# Patient Record
Sex: FEMALE | Race: OTHER | HISPANIC OR LATINO | Employment: UNEMPLOYED | URBAN - METROPOLITAN AREA
[De-identification: names, ages, dates, MRNs, and addresses within clinical notes are randomized per-mention and may not be internally consistent; named-entity substitution may affect disease eponyms.]

---

## 2023-09-27 ENCOUNTER — ANESTHESIA EVENT (OUTPATIENT)
Dept: SURGERY | Facility: HOSPITAL | Age: 48
DRG: 660 | End: 2023-09-27
Payer: MEDICAID

## 2023-09-27 ENCOUNTER — ANESTHESIA (OUTPATIENT)
Dept: SURGERY | Facility: HOSPITAL | Age: 48
DRG: 660 | End: 2023-09-27
Payer: MEDICAID

## 2023-09-27 ENCOUNTER — HOSPITAL ENCOUNTER (INPATIENT)
Facility: HOSPITAL | Age: 48
LOS: 1 days | Discharge: HOME | DRG: 660 | End: 2023-09-28
Attending: EMERGENCY MEDICINE | Admitting: FAMILY MEDICINE
Payer: MEDICAID

## 2023-09-27 DIAGNOSIS — N39.0 UTI (URINARY TRACT INFECTION): Primary | ICD-10-CM

## 2023-09-27 DIAGNOSIS — N17.9 ACUTE KIDNEY INJURY: ICD-10-CM

## 2023-09-27 DIAGNOSIS — Z01.818 PREOPERATIVE CLEARANCE: ICD-10-CM

## 2023-09-27 DIAGNOSIS — N83.201 CYSTS OF BOTH OVARIES: ICD-10-CM

## 2023-09-27 DIAGNOSIS — N20.1 LEFT URETERAL STONE: ICD-10-CM

## 2023-09-27 DIAGNOSIS — N83.202 CYSTS OF BOTH OVARIES: ICD-10-CM

## 2023-09-27 DIAGNOSIS — N20.1 URETERAL STONE: ICD-10-CM

## 2023-09-27 DIAGNOSIS — R31.9 URINARY TRACT INFECTION WITH HEMATURIA, SITE UNSPECIFIED: ICD-10-CM

## 2023-09-27 DIAGNOSIS — I10 HYPERTENSION, UNSPECIFIED TYPE: ICD-10-CM

## 2023-09-27 DIAGNOSIS — N39.0 URINARY TRACT INFECTION WITH HEMATURIA, SITE UNSPECIFIED: ICD-10-CM

## 2023-09-27 DIAGNOSIS — N30.00 ACUTE CYSTITIS WITHOUT HEMATURIA: ICD-10-CM

## 2023-09-27 DIAGNOSIS — N20.0 URINARY TRACT OBSTRUCTION BY KIDNEY STONE: ICD-10-CM

## 2023-09-27 DIAGNOSIS — N13.8 URINARY TRACT OBSTRUCTION BY KIDNEY STONE: ICD-10-CM

## 2023-09-27 DIAGNOSIS — N20.0 KIDNEY STONE: ICD-10-CM

## 2023-09-27 DIAGNOSIS — R10.30 LOWER ABDOMINAL PAIN: ICD-10-CM

## 2023-09-27 LAB
ALBUMIN SERPL BCP-MCNC: 3.9 G/DL (ref 3.5–5.2)
ALP SERPL-CCNC: 84 U/L (ref 55–135)
ALT SERPL W/O P-5'-P-CCNC: 14 U/L (ref 10–44)
ANION GAP SERPL CALC-SCNC: 16 MMOL/L (ref 8–16)
AST SERPL-CCNC: 19 U/L (ref 10–40)
B-HCG UR QL: NEGATIVE
BACTERIA #/AREA URNS HPF: ABNORMAL /HPF
BASOPHILS # BLD AUTO: 0.04 K/UL (ref 0–0.2)
BASOPHILS NFR BLD: 0.4 % (ref 0–1.9)
BILIRUB SERPL-MCNC: 0.8 MG/DL (ref 0.1–1)
BILIRUB UR QL STRIP: NEGATIVE
BNP SERPL-MCNC: 162 PG/ML (ref 0–99)
BUN SERPL-MCNC: 23 MG/DL (ref 6–20)
CALCIUM SERPL-MCNC: 9.6 MG/DL (ref 8.7–10.5)
CHLORIDE SERPL-SCNC: 106 MMOL/L (ref 95–110)
CLARITY UR: CLEAR
CO2 SERPL-SCNC: 18 MMOL/L (ref 23–29)
COLOR UR: YELLOW
CREAT SERPL-MCNC: 1.6 MG/DL (ref 0.5–1.4)
CTP QC/QA: YES
DIFFERENTIAL METHOD: ABNORMAL
EOSINOPHIL # BLD AUTO: 0.2 K/UL (ref 0–0.5)
EOSINOPHIL NFR BLD: 1.5 % (ref 0–8)
ERYTHROCYTE [DISTWIDTH] IN BLOOD BY AUTOMATED COUNT: 14.5 % (ref 11.5–14.5)
EST. GFR  (NO RACE VARIABLE): 39 ML/MIN/1.73 M^2
GLUCOSE SERPL-MCNC: 132 MG/DL (ref 70–110)
GLUCOSE UR QL STRIP: ABNORMAL
HCT VFR BLD AUTO: 41.8 % (ref 37–48.5)
HCV AB SERPL QL IA: NEGATIVE
HEP C VIRUS HOLD SPECIMEN: NORMAL
HGB BLD-MCNC: 13.4 G/DL (ref 12–16)
HGB UR QL STRIP: NEGATIVE
HIV 1+2 AB+HIV1 P24 AG SERPL QL IA: NEGATIVE
HYALINE CASTS #/AREA URNS LPF: 0 /LPF
IMM GRANULOCYTES # BLD AUTO: 0.05 K/UL (ref 0–0.04)
IMM GRANULOCYTES NFR BLD AUTO: 0.5 % (ref 0–0.5)
KETONES UR QL STRIP: NEGATIVE
LACTATE SERPL-SCNC: 0.7 MMOL/L (ref 0.5–2.2)
LEUKOCYTE ESTERASE UR QL STRIP: ABNORMAL
LIPASE SERPL-CCNC: 24 U/L (ref 4–60)
LYMPHOCYTES # BLD AUTO: 1.8 K/UL (ref 1–4.8)
LYMPHOCYTES NFR BLD: 18 % (ref 18–48)
MCH RBC QN AUTO: 29.3 PG (ref 27–31)
MCHC RBC AUTO-ENTMCNC: 32.1 G/DL (ref 32–36)
MCV RBC AUTO: 91 FL (ref 82–98)
MICROSCOPIC COMMENT: ABNORMAL
MONOCYTES # BLD AUTO: 0.7 K/UL (ref 0.3–1)
MONOCYTES NFR BLD: 6.5 % (ref 4–15)
NEUTROPHILS # BLD AUTO: 7.5 K/UL (ref 1.8–7.7)
NEUTROPHILS NFR BLD: 73.1 % (ref 38–73)
NITRITE UR QL STRIP: NEGATIVE
NRBC BLD-RTO: 0 /100 WBC
PH UR STRIP: 6 [PH] (ref 5–8)
PLATELET # BLD AUTO: 236 K/UL (ref 150–450)
PMV BLD AUTO: 10.2 FL (ref 9.2–12.9)
POTASSIUM SERPL-SCNC: 4.1 MMOL/L (ref 3.5–5.1)
PROT SERPL-MCNC: 8.2 G/DL (ref 6–8.4)
PROT UR QL STRIP: ABNORMAL
RBC # BLD AUTO: 4.58 M/UL (ref 4–5.4)
RBC #/AREA URNS HPF: 2 /HPF (ref 0–4)
SODIUM SERPL-SCNC: 140 MMOL/L (ref 136–145)
SP GR UR STRIP: 1.02 (ref 1–1.03)
SQUAMOUS #/AREA URNS HPF: 1 /HPF
TROPONIN I SERPL DL<=0.01 NG/ML-MCNC: 0.01 NG/ML (ref 0–0.03)
TROPONIN I SERPL DL<=0.01 NG/ML-MCNC: <0.006 NG/ML (ref 0–0.03)
URN SPEC COLLECT METH UR: ABNORMAL
UROBILINOGEN UR STRIP-ACNC: NEGATIVE EU/DL
WBC # BLD AUTO: 10.21 K/UL (ref 3.9–12.7)
WBC #/AREA URNS HPF: 47 /HPF (ref 0–5)
WBC CLUMPS URNS QL MICRO: ABNORMAL

## 2023-09-27 PROCEDURE — 52356 PR CYSTO/URETERO W/LITHOTRIPSY: ICD-10-PCS | Mod: LT,,, | Performed by: UROLOGY

## 2023-09-27 PROCEDURE — 63600175 PHARM REV CODE 636 W HCPCS: Performed by: UROLOGY

## 2023-09-27 PROCEDURE — 93010 ELECTROCARDIOGRAM REPORT: CPT | Mod: ,,, | Performed by: INTERNAL MEDICINE

## 2023-09-27 PROCEDURE — 25000003 PHARM REV CODE 250: Performed by: EMERGENCY MEDICINE

## 2023-09-27 PROCEDURE — 63600175 PHARM REV CODE 636 W HCPCS: Performed by: FAMILY MEDICINE

## 2023-09-27 PROCEDURE — 99284 PR EMERGENCY DEPT VISIT,LEVEL IV: ICD-10-PCS | Mod: 25,,, | Performed by: UROLOGY

## 2023-09-27 PROCEDURE — 25000003 PHARM REV CODE 250: Performed by: FAMILY MEDICINE

## 2023-09-27 PROCEDURE — 99231 PR SUBSEQUENT HOSPITAL CARE,LEVL I: ICD-10-PCS | Mod: 25,,, | Performed by: OBSTETRICS & GYNECOLOGY

## 2023-09-27 PROCEDURE — 96365 THER/PROPH/DIAG IV INF INIT: CPT

## 2023-09-27 PROCEDURE — 99900035 HC TECH TIME PER 15 MIN (STAT)

## 2023-09-27 PROCEDURE — 99284 EMERGENCY DEPT VISIT MOD MDM: CPT | Mod: 25,,, | Performed by: UROLOGY

## 2023-09-27 PROCEDURE — 88300 PR  SURG PATH,GROSS,LEVEL I: ICD-10-PCS | Mod: 26,,, | Performed by: PATHOLOGY

## 2023-09-27 PROCEDURE — 93005 ELECTROCARDIOGRAM TRACING: CPT

## 2023-09-27 PROCEDURE — 88300 SURGICAL PATH GROSS: CPT | Performed by: PATHOLOGY

## 2023-09-27 PROCEDURE — 87186 SC STD MICRODIL/AGAR DIL: CPT | Performed by: EMERGENCY MEDICINE

## 2023-09-27 PROCEDURE — 96375 TX/PRO/DX INJ NEW DRUG ADDON: CPT

## 2023-09-27 PROCEDURE — 82365 CALCULUS SPECTROSCOPY: CPT | Performed by: UROLOGY

## 2023-09-27 PROCEDURE — 25000003 PHARM REV CODE 250: Performed by: UROLOGY

## 2023-09-27 PROCEDURE — 87088 URINE BACTERIA CULTURE: CPT | Performed by: EMERGENCY MEDICINE

## 2023-09-27 PROCEDURE — C2617 STENT, NON-COR, TEM W/O DEL: HCPCS | Performed by: UROLOGY

## 2023-09-27 PROCEDURE — 99285 EMERGENCY DEPT VISIT HI MDM: CPT | Mod: 25

## 2023-09-27 PROCEDURE — 99231 SBSQ HOSP IP/OBS SF/LOW 25: CPT | Mod: 25,,, | Performed by: OBSTETRICS & GYNECOLOGY

## 2023-09-27 PROCEDURE — 94799 UNLISTED PULMONARY SVC/PX: CPT

## 2023-09-27 PROCEDURE — 88300 SURGICAL PATH GROSS: CPT | Mod: 26,,, | Performed by: PATHOLOGY

## 2023-09-27 PROCEDURE — 63600175 PHARM REV CODE 636 W HCPCS: Performed by: NURSE ANESTHETIST, CERTIFIED REGISTERED

## 2023-09-27 PROCEDURE — 71000033 HC RECOVERY, INTIAL HOUR: Performed by: UROLOGY

## 2023-09-27 PROCEDURE — 25000003 PHARM REV CODE 250: Performed by: NURSE ANESTHETIST, CERTIFIED REGISTERED

## 2023-09-27 PROCEDURE — 81025 URINE PREGNANCY TEST: CPT | Performed by: ANESTHESIOLOGY

## 2023-09-27 PROCEDURE — C1769 GUIDE WIRE: HCPCS | Performed by: UROLOGY

## 2023-09-27 PROCEDURE — 27201423 OPTIME MED/SURG SUP & DEVICES STERILE SUPPLY: Performed by: UROLOGY

## 2023-09-27 PROCEDURE — 52356 CYSTO/URETERO W/LITHOTRIPSY: CPT | Mod: LT,,, | Performed by: UROLOGY

## 2023-09-27 PROCEDURE — 71000039 HC RECOVERY, EACH ADD'L HOUR: Performed by: UROLOGY

## 2023-09-27 PROCEDURE — 87077 CULTURE AEROBIC IDENTIFY: CPT | Performed by: EMERGENCY MEDICINE

## 2023-09-27 PROCEDURE — 37000009 HC ANESTHESIA EA ADD 15 MINS: Performed by: UROLOGY

## 2023-09-27 PROCEDURE — 36000706: Performed by: UROLOGY

## 2023-09-27 PROCEDURE — 36000707: Performed by: UROLOGY

## 2023-09-27 PROCEDURE — 21400001 HC TELEMETRY ROOM

## 2023-09-27 PROCEDURE — 93010 ELECTROCARDIOGRAM REPORT: CPT | Mod: 76,,, | Performed by: INTERNAL MEDICINE

## 2023-09-27 PROCEDURE — 93010 EKG 12-LEAD: ICD-10-PCS | Mod: ,,, | Performed by: INTERNAL MEDICINE

## 2023-09-27 PROCEDURE — 96361 HYDRATE IV INFUSION ADD-ON: CPT

## 2023-09-27 PROCEDURE — 63600175 PHARM REV CODE 636 W HCPCS: Performed by: EMERGENCY MEDICINE

## 2023-09-27 PROCEDURE — 37000008 HC ANESTHESIA 1ST 15 MINUTES: Performed by: UROLOGY

## 2023-09-27 PROCEDURE — 83605 ASSAY OF LACTIC ACID: CPT | Performed by: EMERGENCY MEDICINE

## 2023-09-27 DEVICE — STENT URETERAL UNIV 6FR 26CM: Type: IMPLANTABLE DEVICE | Site: URETER | Status: FUNCTIONAL

## 2023-09-27 RX ORDER — PROPOFOL 10 MG/ML
VIAL (ML) INTRAVENOUS
Status: DISCONTINUED | OUTPATIENT
Start: 2023-09-27 | End: 2023-09-27

## 2023-09-27 RX ORDER — HYDROMORPHONE HYDROCHLORIDE 2 MG/ML
0.2 INJECTION, SOLUTION INTRAMUSCULAR; INTRAVENOUS; SUBCUTANEOUS EVERY 5 MIN PRN
Status: DISCONTINUED | OUTPATIENT
Start: 2023-09-27 | End: 2023-09-27 | Stop reason: HOSPADM

## 2023-09-27 RX ORDER — PHENYLEPHRINE HYDROCHLORIDE 10 MG/ML
INJECTION INTRAVENOUS
Status: DISCONTINUED | OUTPATIENT
Start: 2023-09-27 | End: 2023-09-27

## 2023-09-27 RX ORDER — ONDANSETRON 2 MG/ML
4 INJECTION INTRAMUSCULAR; INTRAVENOUS EVERY 6 HOURS PRN
Status: DISCONTINUED | OUTPATIENT
Start: 2023-09-27 | End: 2023-09-28 | Stop reason: HOSPADM

## 2023-09-27 RX ORDER — ACETAMINOPHEN 325 MG/1
650 TABLET ORAL EVERY 6 HOURS PRN
Status: DISCONTINUED | OUTPATIENT
Start: 2023-09-27 | End: 2023-09-28 | Stop reason: HOSPADM

## 2023-09-27 RX ORDER — ONDANSETRON 2 MG/ML
4 INJECTION INTRAMUSCULAR; INTRAVENOUS EVERY 12 HOURS PRN
Status: DISCONTINUED | OUTPATIENT
Start: 2023-09-27 | End: 2023-09-28

## 2023-09-27 RX ORDER — CLONIDINE HYDROCHLORIDE 0.1 MG/1
0.1 TABLET ORAL
Status: COMPLETED | OUTPATIENT
Start: 2023-09-27 | End: 2023-09-27

## 2023-09-27 RX ORDER — SUCCINYLCHOLINE CHLORIDE 20 MG/ML
INJECTION INTRAMUSCULAR; INTRAVENOUS
Status: DISCONTINUED | OUTPATIENT
Start: 2023-09-27 | End: 2023-09-27

## 2023-09-27 RX ORDER — ROCURONIUM BROMIDE 10 MG/ML
INJECTION, SOLUTION INTRAVENOUS
Status: DISCONTINUED | OUTPATIENT
Start: 2023-09-27 | End: 2023-09-27

## 2023-09-27 RX ORDER — OXYCODONE AND ACETAMINOPHEN 5; 325 MG/1; MG/1
1 TABLET ORAL
Status: DISCONTINUED | OUTPATIENT
Start: 2023-09-27 | End: 2023-09-27 | Stop reason: HOSPADM

## 2023-09-27 RX ORDER — HYDROCODONE BITARTRATE AND ACETAMINOPHEN 5; 325 MG/1; MG/1
1 TABLET ORAL EVERY 4 HOURS PRN
Status: DISCONTINUED | OUTPATIENT
Start: 2023-09-27 | End: 2023-09-28 | Stop reason: HOSPADM

## 2023-09-27 RX ORDER — FENTANYL CITRATE 50 UG/ML
INJECTION, SOLUTION INTRAMUSCULAR; INTRAVENOUS
Status: DISCONTINUED | OUTPATIENT
Start: 2023-09-27 | End: 2023-09-27

## 2023-09-27 RX ORDER — AMLODIPINE BESYLATE 5 MG/1
10 TABLET ORAL DAILY
Qty: 30 TABLET | Refills: 0 | Status: SHIPPED | OUTPATIENT
Start: 2023-09-27 | End: 2023-09-28 | Stop reason: SDUPTHER

## 2023-09-27 RX ORDER — MORPHINE SULFATE 4 MG/ML
4 INJECTION, SOLUTION INTRAMUSCULAR; INTRAVENOUS
Status: COMPLETED | OUTPATIENT
Start: 2023-09-27 | End: 2023-09-27

## 2023-09-27 RX ORDER — MORPHINE SULFATE 4 MG/ML
2 INJECTION, SOLUTION INTRAMUSCULAR; INTRAVENOUS EVERY 6 HOURS PRN
Status: DISCONTINUED | OUTPATIENT
Start: 2023-09-27 | End: 2023-09-28

## 2023-09-27 RX ORDER — ONDANSETRON 2 MG/ML
4 INJECTION INTRAMUSCULAR; INTRAVENOUS
Status: COMPLETED | OUTPATIENT
Start: 2023-09-27 | End: 2023-09-27

## 2023-09-27 RX ORDER — ONDANSETRON 2 MG/ML
4 INJECTION INTRAMUSCULAR; INTRAVENOUS DAILY PRN
Status: DISCONTINUED | OUTPATIENT
Start: 2023-09-27 | End: 2023-09-27 | Stop reason: HOSPADM

## 2023-09-27 RX ORDER — CLONIDINE HYDROCHLORIDE 0.1 MG/1
0.1 TABLET ORAL 2 TIMES DAILY
Qty: 60 TABLET | Refills: 0 | Status: SHIPPED | OUTPATIENT
Start: 2023-09-27 | End: 2023-10-03 | Stop reason: SDUPTHER

## 2023-09-27 RX ORDER — ONDANSETRON 4 MG/1
4 TABLET, FILM COATED ORAL EVERY 6 HOURS
Qty: 30 TABLET | Refills: 0 | Status: SHIPPED | OUTPATIENT
Start: 2023-09-27 | End: 2023-09-28 | Stop reason: SDUPTHER

## 2023-09-27 RX ORDER — CLONIDINE HYDROCHLORIDE 0.1 MG/1
0.1 TABLET ORAL DAILY
COMMUNITY
End: 2023-09-27 | Stop reason: SDUPTHER

## 2023-09-27 RX ORDER — MIDAZOLAM HYDROCHLORIDE 1 MG/ML
INJECTION, SOLUTION INTRAMUSCULAR; INTRAVENOUS
Status: DISCONTINUED | OUTPATIENT
Start: 2023-09-27 | End: 2023-09-27

## 2023-09-27 RX ORDER — SODIUM CHLORIDE 9 MG/ML
INJECTION, SOLUTION INTRAVENOUS CONTINUOUS
Status: DISCONTINUED | OUTPATIENT
Start: 2023-09-27 | End: 2023-09-28 | Stop reason: HOSPADM

## 2023-09-27 RX ORDER — CEFDINIR 300 MG/1
300 CAPSULE ORAL 2 TIMES DAILY
Qty: 20 CAPSULE | Refills: 0 | Status: SHIPPED | OUTPATIENT
Start: 2023-09-27 | End: 2023-09-28 | Stop reason: SDUPTHER

## 2023-09-27 RX ORDER — MORPHINE SULFATE 4 MG/ML
4 INJECTION, SOLUTION INTRAMUSCULAR; INTRAVENOUS EVERY 6 HOURS PRN
Status: DISCONTINUED | OUTPATIENT
Start: 2023-09-27 | End: 2023-09-28 | Stop reason: HOSPADM

## 2023-09-27 RX ORDER — HYDRALAZINE HYDROCHLORIDE 20 MG/ML
10 INJECTION INTRAMUSCULAR; INTRAVENOUS EVERY 6 HOURS PRN
Status: DISCONTINUED | OUTPATIENT
Start: 2023-09-27 | End: 2023-09-28 | Stop reason: HOSPADM

## 2023-09-27 RX ADMIN — FENTANYL CITRATE 100 MCG: 50 INJECTION, SOLUTION INTRAMUSCULAR; INTRAVENOUS at 01:09

## 2023-09-27 RX ADMIN — MIDAZOLAM 2 MG: 1 INJECTION INTRAMUSCULAR; INTRAVENOUS at 01:09

## 2023-09-27 RX ADMIN — MORPHINE SULFATE 4 MG: 4 INJECTION INTRAVENOUS at 09:09

## 2023-09-27 RX ADMIN — SODIUM CHLORIDE: 9 INJECTION, SOLUTION INTRAVENOUS at 04:09

## 2023-09-27 RX ADMIN — SUGAMMADEX 200 MG: 100 INJECTION, SOLUTION INTRAVENOUS at 02:09

## 2023-09-27 RX ADMIN — CEFTRIAXONE SODIUM 1 G: 1 INJECTION, POWDER, FOR SOLUTION INTRAMUSCULAR; INTRAVENOUS at 03:09

## 2023-09-27 RX ADMIN — PROPOFOL 25 MG: 10 INJECTION, EMULSION INTRAVENOUS at 02:09

## 2023-09-27 RX ADMIN — ONDANSETRON 4 MG: 2 INJECTION INTRAMUSCULAR; INTRAVENOUS at 02:09

## 2023-09-27 RX ADMIN — MORPHINE SULFATE 4 MG: 4 INJECTION INTRAVENOUS at 03:09

## 2023-09-27 RX ADMIN — ONDANSETRON 4 MG: 2 INJECTION INTRAMUSCULAR; INTRAVENOUS at 09:09

## 2023-09-27 RX ADMIN — HYDROCODONE BITARTRATE AND ACETAMINOPHEN 1 TABLET: 5; 325 TABLET ORAL at 05:09

## 2023-09-27 RX ADMIN — SODIUM CHLORIDE 1000 ML: 9 INJECTION, SOLUTION INTRAVENOUS at 03:09

## 2023-09-27 RX ADMIN — ONDANSETRON 4 MG: 2 INJECTION INTRAMUSCULAR; INTRAVENOUS at 03:09

## 2023-09-27 RX ADMIN — PHENYLEPHRINE HYDROCHLORIDE 150 MCG: 10 INJECTION INTRAVENOUS at 02:09

## 2023-09-27 RX ADMIN — SODIUM CHLORIDE: 9 INJECTION, SOLUTION INTRAVENOUS at 09:09

## 2023-09-27 RX ADMIN — PROPOFOL 200 MG: 10 INJECTION, EMULSION INTRAVENOUS at 01:09

## 2023-09-27 RX ADMIN — ROCURONIUM BROMIDE 10 MG: 10 INJECTION, SOLUTION INTRAVENOUS at 01:09

## 2023-09-27 RX ADMIN — SODIUM CHLORIDE, POTASSIUM CHLORIDE, SODIUM LACTATE AND CALCIUM CHLORIDE: 600; 310; 30; 20 INJECTION, SOLUTION INTRAVENOUS at 01:09

## 2023-09-27 RX ADMIN — CLONIDINE HYDROCHLORIDE 0.1 MG: 0.1 TABLET ORAL at 03:09

## 2023-09-27 RX ADMIN — PHENYLEPHRINE HYDROCHLORIDE 100 MCG: 10 INJECTION INTRAVENOUS at 02:09

## 2023-09-27 RX ADMIN — SUCCINYLCHOLINE CHLORIDE 160 MG: 20 INJECTION, SOLUTION INTRAMUSCULAR; INTRAVENOUS; PARENTERAL at 01:09

## 2023-09-27 RX ADMIN — ROCURONIUM BROMIDE 40 MG: 10 INJECTION, SOLUTION INTRAVENOUS at 02:09

## 2023-09-27 NOTE — ED NOTES
"Assumed care of pt at this time, pt states she feels "much better than when I came in." Pt appears well, sitting up in in ED stretcher on monitor, VSS, skin warm and dry, RR even and unlabored. NADN.  "

## 2023-09-27 NOTE — TRANSFER OF CARE
"Anesthesia Transfer of Care Note    Patient: Yanci George    Procedure(s) Performed: Procedure(s) (LRB):  CYSTOURETEROSCOPY, WITH HOLMIUM LASER LITHOTRIPSY OF URETERAL CALCULUS AND STENT INSERTION (Left)    Patient location: PACU    Anesthesia Type: general    Transport from OR: Transported from OR on room air with adequate spontaneous ventilation    Post pain: adequate analgesia    Post assessment: no apparent anesthetic complications    Post vital signs: stable    Level of consciousness: sedated    Nausea/Vomiting: no nausea/vomiting    Complications: none    Transfer of care protocol was followed      Last vitals:   Visit Vitals  BP (!) 126/57   Pulse 74   Temp 37.6 °C (99.7 °F) (Oral)   Resp 18   Ht 5' 9" (1.753 m)   Wt (!) 151.5 kg (334 lb)   LMP 08/05/2023   SpO2 95%   Breastfeeding No   BMI 49.32 kg/m²     "

## 2023-09-27 NOTE — HPI
Patient is a 48 y.o. Sami speaking female with a PMHx of HTN who presents to the Emergency Department for evaluation of LLQ abdominal pain that radiates to the lower left back which onset a few days PTA. Patient reports having kidney stones and UTI in the past. Symptoms feels similar to previous stones. Associated symptoms include nausea/vomiting, dysuria. Pain has been progressively worsening. Denies any fever, chest pain, sob.     In the ED, CT stone protocol was concerning for 7mm stone in left ureteral vesicular junction along with large bilateral ovarian cysts. Urology consulted in the ed and plans for surgery today. U/A indicative of UTI and patient was started on rocephin.

## 2023-09-27 NOTE — ANESTHESIA PROCEDURE NOTES
Intubation    Date/Time: 9/27/2023 2:01 PM    Performed by: Steve Brian CRNA  Authorized by: Jeffrey Franco II, MD    Intubation:     Induction:  Intravenous    Mask Ventilation:  Easy mask    Attempts:  1    Attempted By:  Student    Method of Intubation:  Video laryngoscopy    Blade:  Billings 3    Laryngeal View Grade: Grade I - full view of cords      Difficult Airway Encountered?: No      Complications:  None    Airway Device:  Oral endotracheal tube    Airway Device Size:  7.5    Style/Cuff Inflation:  Cuffed (inflated to minimal occlusive pressure)    Tube secured:  21    Secured at:  The lips    Placement Verified By:  Capnometry    Complicating Factors:  None    Findings Post-Intubation:  BS equal bilateral  Notes:      Reji Garcia srna

## 2023-09-27 NOTE — CONSULTS
"              Urology Consult    Consulting Physician: Vishnu BROOKS, ER    Reason for consultation: ureter stone    Chief Complaint:  Urinary tract obstruction by kidney stone    HPI:    Yanci George is a 48 y.o.  female who presented to emergency room with a 24 hour history of left lower quadrant pain nausea and vomiting.  She states she is been having pain on and off for 2 years.  She states she has a long history of stones and had 5 stones the last time she was evaluated.  [place] also was told she had large cysts in her uterus.  She denies any fevers or chills.  She states she has been treated for urinary infections over the last few months.    Past Medical History:   Diagnosis Date    Hypertension     Enlarged heart    Past Surgical History:   Procedure Laterality Date     SECTION N/A     For C-sections    Social History     Socioeconomic History    Marital status: Unknown   Tobacco Use    Smoking status: Never    Smokeless tobacco: Never   Substance and Sexual Activity    Drug use: Never      She denies any tobacco or alcohol   She takes care of her   History reviewed. No pertinent family history.     Review of patient's allergies indicates:  No Known Allergies    Medications:      To antihypertensives      VITALS (Last 24H):  Temp:  [99.7 °F (37.6 °C)]   Pulse:  [67-92]   Resp:  [12-28]   BP: (119-205)/(57-84)   SpO2:  [94 %-98 %]     INTAKE & OUTPUT (Last 24H):  No intake or output data in the 24 hours ending 23 1232      PHYSICAL EXAM:    Mild distress secondary to pain   Morbidly obese   Abdomen is soft   Left lower quadrant tenderness         Laboratory Data:  Reviewed and noted in plan where applicable. Please see chart for full laboratory data.    Recent Labs   Lab 23  0252   TROPONINI 0.015  <0.006    No results for input(s): "POCTGLUCOSE" in the last 24 hours.     No results found for: "INR", "PROTIME"    Lab Results   Component " Value Date    WBC 10.21 09/27/2023    HGB 13.4 09/27/2023    HCT 41.8 09/27/2023    MCV 91 09/27/2023     09/27/2023       BMP  Recent Labs   Lab 09/27/23  0252   *      K 4.1      CO2 18*   BUN 23*   CREATININE 1.6*   CALCIUM 9.6     Component Ref Range & Units 02:48   RBC, UA 0 - 4 /hpf 2    WBC, UA 0 - 5 /hpf 47 High     WBC Clumps, UA None-Rare Few Abnormal     Bacteria None-Occ /hpf Many Abnormal     Squam Epithel, UA /hpf 1    Hyaline Casts, UA 0-1/lpf /lpf 0    Microscopic Comment  SEE COMMENT    Comment: Other formed elements not mentioned in the report are not   present in the microscopic examination.    Resulting Agency  [facility]       Radiology:  Reviewed and noted in plan where applicable. Please see chart for full radiology data.  X-Ray Chest AP Portable  Narrative: EXAMINATION:  XR CHEST AP PORTABLE    CLINICAL HISTORY:  Chest Pain;    FINDINGS:  Comparison: None available.    Mediastinal silhouette is enlarged.  The lungs are clear.  Possible small left pleural effusion.  No acute osseous finding.  Impression: Cardiomegaly and possible small left pleural effusion.    Electronically signed by: Nitin Viramontes  Date:    09/27/2023  Time:    07:52  CT Renal Stone Study ABD Pelvis WO  Narrative: EXAMINATION:  CT RENAL STONE STUDY ABD PELVIS WO    CLINICAL HISTORY:  Flank pain, kidney stone suspected;    COMPARISON:  None available.    TECHNIQUE:  Axial CT images were obtained of the abdomen and pelvis.  Iterative reconstruction technique was used. The CT exam was performed using one or more of the following dose reduction techniques- Automated exposure control, adjustment of the mA and/or kV according to patient size, and/or use of iterative reconstructed technique.    FINDINGS:  Heart: Heart is enlarged.    Lung Bases: Mild bibasilar atelectasis/scarring.    Liver: Unremarkable.    Gallbladder: No calcified gallstones.    Bile Ducts: No evidence of dilated ducts.    Pancreas: No mass  or peripancreatic fat stranding.    Spleen: Unremarkable.    Adrenals: Unremarkable.    Kidneys/ Ureters: 7 mm calculus at the left ureteral vesicular junction.  Left kidney has areas of scarring and atrophy.  Right kidney has a parenchymal calcification.  No renal collecting system calcification.  No hydronephrosis or hydroureter.    Bladder: Previously described stone at the ureteral vesicular junction.  Bladder is otherwise unremarkable in appearance.    Reproductive organs: There are large bilateral adnexal cystic lesions, the left measures up to 17 cm in the right measures up to approximately 10 cm.    GI Tract/Mesentery: Numerous colonic diverticula.  No CT evidence of acute diverticulitis.  The appendix is normal appearance.    Peritoneal Space: Unremarkable.    Retroperitoneum: No adenopathy.    Abdominal wall: Unremarkable.    Vasculature: Mild atherosclerotic disease. No aortic aneurysm.    Bones: No acute fracture.  Impression: 1. 7 mm calculus at the left ureteral vesicular junction.  Chronic scarring and atrophy involving the left kidney.  2. Large bilateral ovarian cystic lesions measuring up to 17 cm on the left.  Follow-up with and management by gynecology is recommended.    Electronically signed by: Nitin Viramontes  Date:    09/27/2023  Time:    07:37       ASSESSMENT/PLAN:      Left lower quadrant pain.  Left ureteral stone.  Bilateral large ovarian cysts.    I reviewed her her independently reviewed her CT scan.  She does have an atrophic, likely non functional kidney on the left side with a distal left ureteral calculus.  I suspect this stone has been there for several years.  Her left-sided pain may be due to the ureteral stone however also could be due to the large ovarian cysts. Will defer to gynecology as outpatient or inpatient.  With low-grade fever and urinalysis consistent with possible UTI and obstructing left distal ureteral stone, I recommend cystoscopy with left ureteral stent  placement and possible ureteroscopy to retrieve the stone.  I discussed all of this in detail with her as well as her history through a online  using both audio and video.  She was consented for a left ureteroscopic stone extraction.  She understands risks and benefits.  She understands the stent is temporary.  She understands the importance of follow up.

## 2023-09-27 NOTE — ANESTHESIA POSTPROCEDURE EVALUATION
Anesthesia Post Evaluation    Patient: Yanci George    Procedure(s) Performed: Procedure(s) (LRB):  CYSTOURETEROSCOPY, WITH HOLMIUM LASER LITHOTRIPSY OF URETERAL CALCULUS AND STENT INSERTION (Left)    Final Anesthesia Type: general      Patient location during evaluation: PACU  Patient participation: Yes- Able to Participate  Level of consciousness: awake and alert  Post-procedure vital signs: reviewed and stable  Pain management: adequate  Airway patency: patent  KIMBERLY mitigation strategies: Verification of full reversal of neuromuscular block  PONV status at discharge: No PONV  Anesthetic complications: no      Cardiovascular status: hemodynamically stable  Respiratory status: spontaneous ventilation  Hydration status: euvolemic  Follow-up not needed.          Vitals Value Taken Time   /65 09/27/23 1514   Temp 36.8 °C (98.3 °F) 09/27/23 1456   Pulse 73 09/27/23 1520   Resp 23 09/27/23 1520   SpO2 95 % 09/27/23 1520   Vitals shown include unvalidated device data.      No case tracking events are documented in the log.      Pain/Susannah Score: Pain Rating Prior to Med Admin: 8 (9/27/2023  3:54 AM)  Pain Rating Post Med Admin: 3 (9/27/2023  4:19 AM)  Susannah Score: 7 (9/27/2023  3:00 PM)

## 2023-09-27 NOTE — ED PROVIDER NOTES
SCRIBE #1 NOTE: IFela, am scribing for, and in the presence of, Solomon Branch MD. I have scribed the HPI, ROS, and PEx.     SCRIBE #2 NOTE: I, Susan Nixon, am scribing for, and in the presence of,  Hardik Mares MD. I have scribed the remaining portions of the note not scribed by Scribe #1.      History     Chief Complaint   Patient presents with    Abdominal Pain     LLQ abd pain starting few days ago. HA and back pain also. +nausea, mouth is very dry and she has reflux. Burning with urination.    Medication Refill     Needs refill of BP medications     Review of patient's allergies indicates:  No Known Allergies      History of Present Illness     HPI    2023, 4:24 AM  History obtained from the patient with tele-      History of Present Illness: Yanci George is a 48 y.o. female patient with a PMHx of HTN who presents to the Emergency Department for evaluation of LLQ abdominal pain that radiates to the lower left back which onset a few days PTA. The pt states that she has a Hx of nephrolithiasis and UTIs. Symptoms are constant and moderate in severity. No mitigating or exacerbating factors reported. Associated sxs include N/V, dysuria, headache, and SOB. Patient denies any fever, CP, sore throat, cough, weakness, and all other sxs at this time. No prior Tx reported. The pt is also requesting a refill for her HTN medications: Clonidine and Enalapril. No further complaints or concerns at this time.       Arrival mode: Personal vehicle    PCP: No primary care provider on file.        Past Medical History:  Past Medical History:   Diagnosis Date    Hypertension        Past Surgical History:  Past Surgical History:   Procedure Laterality Date     SECTION N/A          Family History:  History reviewed. No pertinent family history.    Social History:  Social History     Tobacco Use    Smoking status: Never    Smokeless tobacco: Never   Substance and Sexual Activity     Alcohol use: Not on file    Drug use: Never    Sexual activity: Not on file        Review of Systems     Review of Systems   Constitutional:  Negative for fever.   HENT:  Negative for sore throat.    Respiratory:  Positive for shortness of breath. Negative for cough.    Cardiovascular:  Negative for chest pain.   Gastrointestinal:  Positive for abdominal pain (LLQ), nausea and vomiting.   Genitourinary:  Positive for dysuria.   Musculoskeletal:  Positive for back pain (lower left).   Skin:  Negative for rash.   Neurological:  Positive for headaches. Negative for weakness.   Hematological:  Does not bruise/bleed easily.   All other systems reviewed and are negative.     Physical Exam     Initial Vitals   BP Pulse Resp Temp SpO2   09/27/23 0237 09/27/23 0234 09/27/23 0234 09/27/23 0237 09/27/23 0234   (!) 205/83 92 (!) 24 99.7 °F (37.6 °C) 98 %      MAP       --                 Physical Exam  Nursing Notes and Vital Signs Reviewed.  Constitutional: Patient is in no acute distress. Well-developed and well-nourished.  Head: Atraumatic. Normocephalic.  Eyes: PERRL. EOM intact. Conjunctivae are not pale. No scleral icterus.  ENT: Mucous membranes are moist. Oropharynx is clear and symmetric.    Neck: Supple. Full ROM. No lymphadenopathy.  Cardiovascular: Regular rate. Regular rhythm. No murmurs, rubs, or gallops. Distal pulses are 2+ and symmetric.  Pulmonary/Chest: No respiratory distress. Clear to auscultation bilaterally. No wheezing or rales.  Abdominal: Soft and non-distended.  There is no tenderness.  No rebound, guarding, or rigidity. Good bowel sounds.  Genitourinary: No CVA tenderness  Musculoskeletal: Moves all extremities. No obvious deformities. No edema. No calf tenderness.  Skin: Warm and dry.  Neurological:  Alert, awake, and appropriate.  Normal speech.  No acute focal neurological deficits are appreciated.  Psychiatric: Normal affect. Good eye contact. Appropriate in content.     ED Course  "  Procedures  ED Vital Signs:  Vitals:    09/27/23 0632 09/27/23 0717 09/27/23 0903 09/27/23 1017   BP: 125/67 (!) 119/58 138/74 (!) 126/57   Pulse: 73 77 67    Resp: (!) 28 20 18    Temp:       TempSrc:       SpO2: 96% (!) 94% 95%    Weight:       Height:        09/27/23 1019 09/27/23 1021 09/27/23 1317 09/27/23 1456   BP:    (!) 162/76   Pulse: 74   109   Resp:    20   Temp:    98.3 °F (36.8 °C)   TempSrc:    Temporal   SpO2:  95%  96%   Weight:       Height:   5' 9" (1.753 m)     09/27/23 1500 09/27/23 1505 09/27/23 1515 09/27/23 1530   BP: (!) 145/66 (!) 142/67 139/65 128/70   Pulse: 74 79 74 74   Resp: 15 14 16 16   Temp:       TempSrc:       SpO2: 97% 95% 100% 97%   Weight:       Height:        09/27/23 1545 09/27/23 1610 09/27/23 1618   BP: 130/67 132/62    Pulse: 66 71    Resp: (!) 7 18    Temp: 98.2 °F (36.8 °C) 97.7 °F (36.5 °C)    TempSrc: Temporal Oral    SpO2: 99% 100%    Weight:   (!) 152 kg (335 lb 1.6 oz)   Height:          Abnormal Lab Results:  Labs Reviewed   CBC W/ AUTO DIFFERENTIAL - Abnormal; Notable for the following components:       Result Value    Immature Grans (Abs) 0.05 (*)     Gran % 73.1 (*)     All other components within normal limits   COMPREHENSIVE METABOLIC PANEL - Abnormal; Notable for the following components:    CO2 18 (*)     Glucose 132 (*)     BUN 23 (*)     Creatinine 1.6 (*)     eGFR 39 (*)     All other components within normal limits   B-TYPE NATRIURETIC PEPTIDE - Abnormal; Notable for the following components:     (*)     All other components within normal limits   URINALYSIS, REFLEX TO URINE CULTURE - Abnormal; Notable for the following components:    Protein, UA 3+ (*)     Glucose, UA Trace (*)     Leukocytes, UA 2+ (*)     All other components within normal limits    Narrative:     Specimen Source->Urine   URINALYSIS MICROSCOPIC - Abnormal; Notable for the following components:    WBC, UA 47 (*)     WBC Clumps, UA Few (*)     Bacteria Many (*)     All other " components within normal limits    Narrative:     Specimen Source->Urine   CULTURE, URINE   TROPONIN I   TROPONIN I   LIPASE   HIV 1 / 2 ANTIBODY    Narrative:     Release to patient->Immediate   HEPATITIS C ANTIBODY    Narrative:     Release to patient->Immediate   HEP C VIRUS HOLD SPECIMEN    Narrative:     Release to patient->Immediate   LACTIC ACID, PLASMA        All Lab Results:  Results for orders placed or performed during the hospital encounter of 09/27/23   CBC auto differential   Result Value Ref Range    WBC 10.21 3.90 - 12.70 K/uL    RBC 4.58 4.00 - 5.40 M/uL    Hemoglobin 13.4 12.0 - 16.0 g/dL    Hematocrit 41.8 37.0 - 48.5 %    MCV 91 82 - 98 fL    MCH 29.3 27.0 - 31.0 pg    MCHC 32.1 32.0 - 36.0 g/dL    RDW 14.5 11.5 - 14.5 %    Platelets 236 150 - 450 K/uL    MPV 10.2 9.2 - 12.9 fL    Immature Granulocytes 0.5 0.0 - 0.5 %    Gran # (ANC) 7.5 1.8 - 7.7 K/uL    Immature Grans (Abs) 0.05 (H) 0.00 - 0.04 K/uL    Lymph # 1.8 1.0 - 4.8 K/uL    Mono # 0.7 0.3 - 1.0 K/uL    Eos # 0.2 0.0 - 0.5 K/uL    Baso # 0.04 0.00 - 0.20 K/uL    nRBC 0 0 /100 WBC    Gran % 73.1 (H) 38.0 - 73.0 %    Lymph % 18.0 18.0 - 48.0 %    Mono % 6.5 4.0 - 15.0 %    Eosinophil % 1.5 0.0 - 8.0 %    Basophil % 0.4 0.0 - 1.9 %    Differential Method Automated    Comprehensive metabolic panel   Result Value Ref Range    Sodium 140 136 - 145 mmol/L    Potassium 4.1 3.5 - 5.1 mmol/L    Chloride 106 95 - 110 mmol/L    CO2 18 (L) 23 - 29 mmol/L    Glucose 132 (H) 70 - 110 mg/dL    BUN 23 (H) 6 - 20 mg/dL    Creatinine 1.6 (H) 0.5 - 1.4 mg/dL    Calcium 9.6 8.7 - 10.5 mg/dL    Total Protein 8.2 6.0 - 8.4 g/dL    Albumin 3.9 3.5 - 5.2 g/dL    Total Bilirubin 0.8 0.1 - 1.0 mg/dL    Alkaline Phosphatase 84 55 - 135 U/L    AST 19 10 - 40 U/L    ALT 14 10 - 44 U/L    eGFR 39 (A) >60 mL/min/1.73 m^2    Anion Gap 16 8 - 16 mmol/L   Troponin I #1   Result Value Ref Range    Troponin I <0.006 0.000 - 0.026 ng/mL   Troponin I #2   Result Value Ref  Range    Troponin I 0.015 0.000 - 0.026 ng/mL   BNP   Result Value Ref Range     (H) 0 - 99 pg/mL   Lipase   Result Value Ref Range    Lipase 24 4 - 60 U/L   Urinalysis, Reflex to Urine Culture Urine, Clean Catch    Specimen: Urine   Result Value Ref Range    Specimen UA Urine, Clean Catch     Color, UA Yellow Yellow, Straw, Lisbeth    Appearance, UA Clear Clear    pH, UA 6.0 5.0 - 8.0    Specific Gravity, UA 1.020 1.005 - 1.030    Protein, UA 3+ (A) Negative    Glucose, UA Trace (A) Negative    Ketones, UA Negative Negative    Bilirubin (UA) Negative Negative    Occult Blood UA Negative Negative    Nitrite, UA Negative Negative    Urobilinogen, UA Negative <2.0 EU/dL    Leukocytes, UA 2+ (A) Negative   HIV 1/2 Ag/Ab (4th Gen)   Result Value Ref Range    HIV 1/2 Ag/Ab Negative Negative   Hepatitis C Antibody   Result Value Ref Range    Hepatitis C Ab Negative Negative   HCV Virus Hold Specimen   Result Value Ref Range    HEP C Virus Hold Specimen Hold for HCV sendout    Urinalysis Microscopic   Result Value Ref Range    RBC, UA 2 0 - 4 /hpf    WBC, UA 47 (H) 0 - 5 /hpf    WBC Clumps, UA Few (A) None-Rare    Bacteria Many (A) None-Occ /hpf    Squam Epithel, UA 1 /hpf    Hyaline Casts, UA 0 0-1/lpf /lpf    Microscopic Comment SEE COMMENT    Lactic acid, plasma   Result Value Ref Range    Lactate (Lactic Acid) 0.7 0.5 - 2.2 mmol/L   POCT urine pregnancy   Result Value Ref Range    POC Preg Test, Ur Negative Negative     Acceptable Yes          Imaging Results:  Imaging Results              CT Renal Stone Study ABD Pelvis WO (Final result)  Result time 09/27/23 07:37:40      Final result by Nitin Viramontes MD (09/27/23 07:37:40)                   Impression:      1. 7 mm calculus at the left ureteral vesicular junction.  Chronic scarring and atrophy involving the left kidney.  2. Large bilateral ovarian cystic lesions measuring up to 17 cm on the left.  Follow-up with and management by gynecology is  recommended.      Electronically signed by: Nitin Viramontes  Date:    09/27/2023  Time:    07:37               Narrative:    EXAMINATION:  CT RENAL STONE STUDY ABD PELVIS WO    CLINICAL HISTORY:  Flank pain, kidney stone suspected;    COMPARISON:  None available.    TECHNIQUE:  Axial CT images were obtained of the abdomen and pelvis.  Iterative reconstruction technique was used. The CT exam was performed using one or more of the following dose reduction techniques- Automated exposure control, adjustment of the mA and/or kV according to patient size, and/or use of iterative reconstructed technique.    FINDINGS:  Heart: Heart is enlarged.    Lung Bases: Mild bibasilar atelectasis/scarring.    Liver: Unremarkable.    Gallbladder: No calcified gallstones.    Bile Ducts: No evidence of dilated ducts.    Pancreas: No mass or peripancreatic fat stranding.    Spleen: Unremarkable.    Adrenals: Unremarkable.    Kidneys/ Ureters: 7 mm calculus at the left ureteral vesicular junction.  Left kidney has areas of scarring and atrophy.  Right kidney has a parenchymal calcification.  No renal collecting system calcification.  No hydronephrosis or hydroureter.    Bladder: Previously described stone at the ureteral vesicular junction.  Bladder is otherwise unremarkable in appearance.    Reproductive organs: There are large bilateral adnexal cystic lesions, the left measures up to 17 cm in the right measures up to approximately 10 cm.    GI Tract/Mesentery: Numerous colonic diverticula.  No CT evidence of acute diverticulitis.  The appendix is normal appearance.    Peritoneal Space: Unremarkable.    Retroperitoneum: No adenopathy.    Abdominal wall: Unremarkable.    Vasculature: Mild atherosclerotic disease. No aortic aneurysm.    Bones: No acute fracture.                                       X-Ray Chest AP Portable (Final result)  Result time 09/27/23 07:52:15      Final result by Nitin Viramontes MD (09/27/23 07:52:15)                    Impression:      Cardiomegaly and possible small left pleural effusion.      Electronically signed by: Nitin Viramontes  Date:    09/27/2023  Time:    07:52               Narrative:    EXAMINATION:  XR CHEST AP PORTABLE    CLINICAL HISTORY:  Chest Pain;    FINDINGS:  Comparison: None available.    Mediastinal silhouette is enlarged.  The lungs are clear.  Possible small left pleural effusion.  No acute osseous finding.                        Wet Read by Solomon Branch MD (09/27/23 03:42:40, [facility] - Emergency Dept[facility] Emergency Medicine)    cardiomegaly                                     The EKG was ordered, reviewed, and independently interpreted by the ED provider.  Interpretation time: 02:51  Rate: 86 BPM  Rhythm: normal sinus rhythm  Interpretation: Possible left atrial enlargement. Left axis deviation. Left bundle branch block. No STEMI.           The Emergency Provider reviewed the vital signs and test results, which are outlined above.     ED Discussion     6:00 AM: Dr. Branch transfers care of patient to Dr. Mares pending imaging results.    11:44 AM: Following consult with Dr. Patrick who recommends to keep the pt NPO until he can see them.    12:14 PM: Discussed case with  (Hospital Medicine). Dr. Arana agrees with current care and management of pt and accepts admission.   Admitting Service: Hospital Medicine  Admitting Physician: Dr. Arana  Admit to: med surg     12:14 PM: Re-evaluated pt. I have discussed test results, shared treatment plan, and the need for admission with patient and family at bedside. Pt and family express understanding at this time and agree with all information. All questions answered. Pt and family have no further questions or concerns at this time. Pt is ready for admit.     ED Course as of 09/27/23 1630   Wed Sep 27, 2023   0706 BNP(!): 162 [ABBI]   0707 CO2(!): 18 [ABBI]   0707 BUN(!): 23 [ABBI]   0707 Creatinine(!): 1.6 [ABBI]   0707 Troponin I: 0.015 [ABBI]   0707 Lipase: 24 [ABBI]   0707  WBC: 10.21 [ABBI]   0707 Hemoglobin: 13.4 [ABBI]   0707 Hematocrit: 41.8 [ABBI]   0707 WBC, UA(!): 47 [ABBI]   0813 CT Renal Stone Study ABD Pelvis WO  Positive stone noted [ABBI]   0818 CT Renal Stone Study ABD Pelvis WO [ABBI]      ED Course User Index  [ABBI] Hardik Mares MD     Medical Decision Making  Problems Addressed:  Acute kidney injury: undiagnosed new problem with uncertain prognosis  Kidney stone: acute illness or injury that poses a threat to life or bodily functions  Lower abdominal pain: acute illness or injury that poses a threat to life or bodily functions  UTI (urinary tract infection): acute illness or injury that poses a threat to life or bodily functions    Amount and/or Complexity of Data Reviewed  Independent Historian: spouse     Details: Relates that the patient has had similar episodes in the past but they resolved after pain medication-it was when she was in [place] about 3 years ago  Labs: ordered. Decision-making details documented in ED Course.  Radiology: ordered and independent interpretation performed. Decision-making details documented in ED Course.     Details: Left kidney stone - small left kidney (question chronic obstruction)  ECG/medicine tests: ordered. Decision-making details documented in ED Course.     Details: No STEMI  Discussion of management or test interpretation with external provider(s): I discussed the care with Hospital Medicine and they agree with the current management.   requests I placed the order for admission and he will handle all additional orders.    Risk  Prescription drug management.  Parenteral controlled substances.  Decision regarding hospitalization.  Diagnosis or treatment significantly limited by social determinants of health.  Risk Details: Differential diagnosis;  Gastroenteritis, Bowel obstruction, Colitis, Diverticulitis, Cholecystitis, Appendicitis, Perforated bowel, Herniation, Infectious etiology, UTI, Pyelonephritis, Inferior cardiac infarct,  Biliary obstruction, kidney stone                 ED Medication(s):  Medications   cefTRIAXone (ROCEPHIN) 1 g in dextrose 5 % in water (D5W) 100 mL IVPB (MB+) (has no administration in time range)   morphine injection 4 mg (has no administration in time range)   morphine injection 2 mg (has no administration in time range)   0.9%  NaCl infusion ( Intravenous New Bag 9/27/23 1617)   acetaminophen tablet 650 mg (has no administration in time range)   ondansetron injection 4 mg (4 mg Intravenous Given 9/27/23 1412)   hydrALAZINE injection 10 mg (has no administration in time range)   HYDROcodone-acetaminophen 5-325 mg per tablet 1 tablet (has no administration in time range)   ondansetron injection 4 mg (has no administration in time range)   cefTRIAXone (ROCEPHIN) 1 g in dextrose 5 % in water (D5W) 100 mL IVPB (MB+) (0 g Intravenous Stopped 9/27/23 0403)   sodium chloride 0.9% bolus 1,000 mL 1,000 mL (0 mLs Intravenous Stopped 9/27/23 0634)   cloNIDine tablet 0.1 mg (0.1 mg Oral Given 9/27/23 0353)   ondansetron injection 4 mg (4 mg Intravenous Given 9/27/23 0354)   morphine injection 4 mg (4 mg Intravenous Given 9/27/23 0354)       Current Discharge Medication List        START taking these medications    Details   amLODIPine (NORVASC) 5 MG tablet Take 2 tablets (10 mg total) by mouth once daily.  Qty: 30 tablet, Refills: 0    Comments: .      cefdinir (OMNICEF) 300 MG capsule Take 1 capsule (300 mg total) by mouth 2 (two) times daily. for 10 days  Qty: 20 capsule, Refills: 0      ondansetron (ZOFRAN) 4 MG tablet Take 1 tablet (4 mg total) by mouth every 6 (six) hours.  Qty: 30 tablet, Refills: 0              Follow-up Information       Please follow up.    Contact information:  Follow-up with your primary care doctor in 1-2 days for recheck                               Scribe Attestation:   Scribe #1: I performed the above scribed service and the documentation accurately describes the services I performed. I attest  to the accuracy of the note.     Attending:   Physician Attestation Statement for Scribe #1: I, Solomon Branch MD, personally performed the services described in this documentation, as scribed by Fela Rodriguez, in my presence, and it is both accurate and complete.       Scribe Attestation:   Scribe #2: I performed the above scribed service and the documentation accurately describes the services I performed. I attest to the accuracy of the note.    Attending Attestation:           Physician Attestation for Scribe:    Physician Attestation Statement for Scribe #2: I, Hardik Mares MD, reviewed documentation, as scribed by Susan Nixon in my presence, and it is both accurate and complete. I also acknowledge and confirm the content of the note done by Scribe #1.           Clinical Impression       ICD-10-CM ICD-9-CM   1. UTI (urinary tract infection)  N39.0 599.0   2. Lower abdominal pain  R10.30 789.09   3. Hypertension, unspecified type  I10 401.9   4. Urinary tract infection with hematuria, site unspecified  N39.0 599.0    R31.9 599.70   5. Acute kidney injury  N17.9 584.9   6. Preoperative clearance  Z01.818 V72.84   7. Kidney stone  N20.0 592.0   8. Ureteral stone  N20.1 592.1   9. Left ureteral stone  N20.1 592.1   10. Urinary tract obstruction by kidney stone  N20.0 592.0    N13.8 599.69   11. Acute cystitis without hematuria  N30.00 595.0       Disposition:   Disposition: Admitted  Condition: Hardik Bah MD  09/27/23 5568

## 2023-09-27 NOTE — H&P
[facility]  [facility] Medicine  History & Physical    Patient Name: Yanci George  MRN: [id number]  Patient Class: IP- Inpatient  Admission Date: 2023  Attending Physician: Brian Arana MD  Primary Care Provider: No primary care provider on file.         Patient information was obtained from patient and ER records.     Subjective:     Principal Problem:Urinary tract obstruction by kidney stone    Chief Complaint:   Chief Complaint   Patient presents with    Abdominal Pain     LLQ abd pain starting few days ago. HA and back pain also. +nausea, mouth is very dry and she has reflux. Burning with urination.    Medication Refill     Needs refill of BP medications        HPI: Patient is a 48 y.o. Cymro speaking female with a PMHx of HTN who presents to the Emergency Department for evaluation of LLQ abdominal pain that radiates to the lower left back which onset a few days PTA. Patient reports having kidney stones and UTI in the past. Symptoms feels similar to previous stones. Associated symptoms include nausea/vomiting, dysuria. Pain has been progressively worsening. Denies any fever, chest pain, sob.     In the ED, CT stone protocol was concerning for 7mm stone in left ureteral vesicular junction along with large bilateral ovarian cysts. Urology consulted in the ed and plans for surgery today. U/A indicative of UTI and patient was started on rocephin.         Past Medical History:   Diagnosis Date    Hypertension        Past Surgical History:   Procedure Laterality Date     SECTION N/A        Review of patient's allergies indicates:  No Known Allergies    No current facility-administered medications on file prior to encounter.     Current Outpatient Medications on File Prior to Encounter   Medication Sig    [DISCONTINUED] cloNIDine (CATAPRES) 0.1 MG tablet Take 0.1 mg by mouth once daily.     Family History    None       Tobacco Use    Smoking status: Never    Smokeless tobacco:  Never   Substance and Sexual Activity    Alcohol use: Not on file    Drug use: Never    Sexual activity: Not on file     Review of Systems   Constitutional:  Negative for fatigue and fever.   HENT:  Negative for sinus pressure.    Eyes:  Negative for visual disturbance.   Respiratory:  Negative for shortness of breath.    Cardiovascular:  Negative for chest pain.   Gastrointestinal:  Positive for nausea and vomiting.   Genitourinary:  Positive for dysuria, flank pain and frequency. Negative for difficulty urinating.   Musculoskeletal:  Negative for back pain.   Skin:  Negative for rash.   Neurological:  Negative for headaches.   Psychiatric/Behavioral:  Negative for confusion.      Objective:     Vital Signs (Most Recent):  Temp: 99.7 °F (37.6 °C) (09/27/23 0237)  Pulse: 74 (09/27/23 1019)  Resp: 18 (09/27/23 0903)  BP: (!) 126/57 (09/27/23 1017)  SpO2: 95 % (09/27/23 1021) Vital Signs (24h Range):  Temp:  [99.7 °F (37.6 °C)] 99.7 °F (37.6 °C)  Pulse:  [67-92] 74  Resp:  [12-28] 18  SpO2:  [94 %-98 %] 95 %  BP: (119-205)/(57-84) 126/57     Weight: (!) 151.5 kg (334 lb)  There is no height or weight on file to calculate BMI.     Physical Exam  Constitutional:       General: She is not in acute distress.     Appearance: She is well-developed. She is obese. She is not diaphoretic.   HENT:      Head: Normocephalic and atraumatic.   Eyes:      Pupils: Pupils are equal, round, and reactive to light.   Cardiovascular:      Rate and Rhythm: Normal rate and regular rhythm.      Heart sounds: Normal heart sounds. No murmur heard.     No friction rub. No gallop.   Pulmonary:      Effort: Pulmonary effort is normal. No respiratory distress.      Breath sounds: Normal breath sounds. No stridor. No wheezing or rales.   Abdominal:      General: Bowel sounds are normal. There is no distension.      Palpations: Abdomen is soft. There is no mass.      Tenderness: There is no abdominal tenderness. There is left CVA tenderness.  There is no right CVA tenderness or guarding.   Musculoskeletal:      Right lower leg: No edema.      Left lower leg: No edema.   Skin:     General: Skin is warm.      Findings: No erythema.   Neurological:      Mental Status: She is alert and oriented to person, place, and time.              CRANIAL NERVES     CN III, IV, VI   Pupils are equal, round, and reactive to light.       Significant Labs:   Results for orders placed or performed during the hospital encounter of 09/27/23   CBC auto differential   Result Value Ref Range    WBC 10.21 3.90 - 12.70 K/uL    RBC 4.58 4.00 - 5.40 M/uL    Hemoglobin 13.4 12.0 - 16.0 g/dL    Hematocrit 41.8 37.0 - 48.5 %    MCV 91 82 - 98 fL    MCH 29.3 27.0 - 31.0 pg    MCHC 32.1 32.0 - 36.0 g/dL    RDW 14.5 11.5 - 14.5 %    Platelets 236 150 - 450 K/uL    MPV 10.2 9.2 - 12.9 fL    Immature Granulocytes 0.5 0.0 - 0.5 %    Gran # (ANC) 7.5 1.8 - 7.7 K/uL    Immature Grans (Abs) 0.05 (H) 0.00 - 0.04 K/uL    Lymph # 1.8 1.0 - 4.8 K/uL    Mono # 0.7 0.3 - 1.0 K/uL    Eos # 0.2 0.0 - 0.5 K/uL    Baso # 0.04 0.00 - 0.20 K/uL    nRBC 0 0 /100 WBC    Gran % 73.1 (H) 38.0 - 73.0 %    Lymph % 18.0 18.0 - 48.0 %    Mono % 6.5 4.0 - 15.0 %    Eosinophil % 1.5 0.0 - 8.0 %    Basophil % 0.4 0.0 - 1.9 %    Differential Method Automated    Comprehensive metabolic panel   Result Value Ref Range    Sodium 140 136 - 145 mmol/L    Potassium 4.1 3.5 - 5.1 mmol/L    Chloride 106 95 - 110 mmol/L    CO2 18 (L) 23 - 29 mmol/L    Glucose 132 (H) 70 - 110 mg/dL    BUN 23 (H) 6 - 20 mg/dL    Creatinine 1.6 (H) 0.5 - 1.4 mg/dL    Calcium 9.6 8.7 - 10.5 mg/dL    Total Protein 8.2 6.0 - 8.4 g/dL    Albumin 3.9 3.5 - 5.2 g/dL    Total Bilirubin 0.8 0.1 - 1.0 mg/dL    Alkaline Phosphatase 84 55 - 135 U/L    AST 19 10 - 40 U/L    ALT 14 10 - 44 U/L    eGFR 39 (A) >60 mL/min/1.73 m^2    Anion Gap 16 8 - 16 mmol/L   Troponin I #1   Result Value Ref Range    Troponin I <0.006 0.000 - 0.026 ng/mL   Troponin I #2    Result Value Ref Range    Troponin I 0.015 0.000 - 0.026 ng/mL   BNP   Result Value Ref Range     (H) 0 - 99 pg/mL   Lipase   Result Value Ref Range    Lipase 24 4 - 60 U/L   Urinalysis, Reflex to Urine Culture Urine, Clean Catch    Specimen: Urine   Result Value Ref Range    Specimen UA Urine, Clean Catch     Color, UA Yellow Yellow, Straw, Lisbeth    Appearance, UA Clear Clear    pH, UA 6.0 5.0 - 8.0    Specific Gravity, UA 1.020 1.005 - 1.030    Protein, UA 3+ (A) Negative    Glucose, UA Trace (A) Negative    Ketones, UA Negative Negative    Bilirubin (UA) Negative Negative    Occult Blood UA Negative Negative    Nitrite, UA Negative Negative    Urobilinogen, UA Negative <2.0 EU/dL    Leukocytes, UA 2+ (A) Negative   HIV 1/2 Ag/Ab (4th Gen)   Result Value Ref Range    HIV 1/2 Ag/Ab Negative Negative   Hepatitis C Antibody   Result Value Ref Range    Hepatitis C Ab Negative Negative   HCV Virus Hold Specimen   Result Value Ref Range    HEP C Virus Hold Specimen Hold for HCV sendout    Urinalysis Microscopic   Result Value Ref Range    RBC, UA 2 0 - 4 /hpf    WBC, UA 47 (H) 0 - 5 /hpf    WBC Clumps, UA Few (A) None-Rare    Bacteria Many (A) None-Occ /hpf    Squam Epithel, UA 1 /hpf    Hyaline Casts, UA 0 0-1/lpf /lpf    Microscopic Comment SEE COMMENT    Lactic acid, plasma   Result Value Ref Range    Lactate (Lactic Acid) 0.7 0.5 - 2.2 mmol/L        Significant Imaging: X-Ray Chest AP Portable  Narrative: EXAMINATION:  XR CHEST AP PORTABLE    CLINICAL HISTORY:  Chest Pain;    FINDINGS:  Comparison: None available.    Mediastinal silhouette is enlarged.  The lungs are clear.  Possible small left pleural effusion.  No acute osseous finding.  Impression: Cardiomegaly and possible small left pleural effusion.    Electronically signed by: Nitin Viramontes  Date:    09/27/2023  Time:    07:52  CT Renal Stone Study ABD Pelvis WO  Narrative: EXAMINATION:  CT RENAL STONE STUDY ABD PELVIS WO    CLINICAL HISTORY:  Flank  pain, kidney stone suspected;    COMPARISON:  None available.    TECHNIQUE:  Axial CT images were obtained of the abdomen and pelvis.  Iterative reconstruction technique was used. The CT exam was performed using one or more of the following dose reduction techniques- Automated exposure control, adjustment of the mA and/or kV according to patient size, and/or use of iterative reconstructed technique.    FINDINGS:  Heart: Heart is enlarged.    Lung Bases: Mild bibasilar atelectasis/scarring.    Liver: Unremarkable.    Gallbladder: No calcified gallstones.    Bile Ducts: No evidence of dilated ducts.    Pancreas: No mass or peripancreatic fat stranding.    Spleen: Unremarkable.    Adrenals: Unremarkable.    Kidneys/ Ureters: 7 mm calculus at the left ureteral vesicular junction.  Left kidney has areas of scarring and atrophy.  Right kidney has a parenchymal calcification.  No renal collecting system calcification.  No hydronephrosis or hydroureter.    Bladder: Previously described stone at the ureteral vesicular junction.  Bladder is otherwise unremarkable in appearance.    Reproductive organs: There are large bilateral adnexal cystic lesions, the left measures up to 17 cm in the right measures up to approximately 10 cm.    GI Tract/Mesentery: Numerous colonic diverticula.  No CT evidence of acute diverticulitis.  The appendix is normal appearance.    Peritoneal Space: Unremarkable.    Retroperitoneum: No adenopathy.    Abdominal wall: Unremarkable.    Vasculature: Mild atherosclerotic disease. No aortic aneurysm.    Bones: No acute fracture.  Impression: 1. 7 mm calculus at the left ureteral vesicular junction.  Chronic scarring and atrophy involving the left kidney.  2. Large bilateral ovarian cystic lesions measuring up to 17 cm on the left.  Follow-up with and management by gynecology is recommended.    Electronically signed by: Nitin Viramontes  Date:    09/27/2023  Time:    07:37        Assessment/Plan:     *  Urinary tract obstruction by kidney stone  Urology consulted on case  Will cont IVF  Morphine PRN  Keep NPO for surgery      Cysts of both ovaries  Will consult OBGYN on case       Hypertension  Taking clonidine and ACE at home  Will cont on hydralazine PRN   Clonidine not first line agent for HTN  Will plan to switch to CCB or thiazide diuretics      UTI (urinary tract infection)  Cont rocephin  U/a pending  Will consider escalation to merrem   Should pt condition worsen       VTE Risk Mitigation (From admission, onward)    None                     Brian Arana MD  Department of Hospital Medicine  [facility] - Emergency Dept.

## 2023-09-27 NOTE — ED NOTES
Pt resting in ED stretcher comfortably, skin warm and dry, RR even and unlabored. ROBBIES. NADN.

## 2023-09-27 NOTE — SUBJECTIVE & OBJECTIVE
Past Medical History:   Diagnosis Date    Hypertension        Past Surgical History:   Procedure Laterality Date     SECTION N/A        Review of patient's allergies indicates:  No Known Allergies    No current facility-administered medications on file prior to encounter.     Current Outpatient Medications on File Prior to Encounter   Medication Sig    [DISCONTINUED] cloNIDine (CATAPRES) 0.1 MG tablet Take 0.1 mg by mouth once daily.     Family History    None       Tobacco Use    Smoking status: Never    Smokeless tobacco: Never   Substance and Sexual Activity    Alcohol use: Not on file    Drug use: Never    Sexual activity: Not on file     Review of Systems   Constitutional:  Negative for fatigue and fever.   HENT:  Negative for sinus pressure.    Eyes:  Negative for visual disturbance.   Respiratory:  Negative for shortness of breath.    Cardiovascular:  Negative for chest pain.   Gastrointestinal:  Positive for nausea and vomiting.   Genitourinary:  Positive for dysuria, flank pain and frequency. Negative for difficulty urinating.   Musculoskeletal:  Negative for back pain.   Skin:  Negative for rash.   Neurological:  Negative for headaches.   Psychiatric/Behavioral:  Negative for confusion.      Objective:     Vital Signs (Most Recent):  Temp: 99.7 °F (37.6 °C) (23 0237)  Pulse: 74 (23 1019)  Resp: 18 (23 0903)  BP: (!) 126/57 (23 1017)  SpO2: 95 % (23 1021) Vital Signs (24h Range):  Temp:  [99.7 °F (37.6 °C)] 99.7 °F (37.6 °C)  Pulse:  [67-92] 74  Resp:  [12-28] 18  SpO2:  [94 %-98 %] 95 %  BP: (119-205)/(57-84) 126/57     Weight: (!) 151.5 kg (334 lb)  There is no height or weight on file to calculate BMI.     Physical Exam  Constitutional:       General: She is not in acute distress.     Appearance: She is well-developed. She is obese. She is not diaphoretic.   HENT:      Head: Normocephalic and atraumatic.   Eyes:      Pupils: Pupils are equal, round, and reactive to  light.   Cardiovascular:      Rate and Rhythm: Normal rate and regular rhythm.      Heart sounds: Normal heart sounds. No murmur heard.     No friction rub. No gallop.   Pulmonary:      Effort: Pulmonary effort is normal. No respiratory distress.      Breath sounds: Normal breath sounds. No stridor. No wheezing or rales.   Abdominal:      General: Bowel sounds are normal. There is no distension.      Palpations: Abdomen is soft. There is no mass.      Tenderness: There is no abdominal tenderness. There is left CVA tenderness. There is no right CVA tenderness or guarding.   Musculoskeletal:      Right lower leg: No edema.      Left lower leg: No edema.   Skin:     General: Skin is warm.      Findings: No erythema.   Neurological:      Mental Status: She is alert and oriented to person, place, and time.              CRANIAL NERVES     CN III, IV, VI   Pupils are equal, round, and reactive to light.       Significant Labs:   Results for orders placed or performed during the hospital encounter of 09/27/23   CBC auto differential   Result Value Ref Range    WBC 10.21 3.90 - 12.70 K/uL    RBC 4.58 4.00 - 5.40 M/uL    Hemoglobin 13.4 12.0 - 16.0 g/dL    Hematocrit 41.8 37.0 - 48.5 %    MCV 91 82 - 98 fL    MCH 29.3 27.0 - 31.0 pg    MCHC 32.1 32.0 - 36.0 g/dL    RDW 14.5 11.5 - 14.5 %    Platelets 236 150 - 450 K/uL    MPV 10.2 9.2 - 12.9 fL    Immature Granulocytes 0.5 0.0 - 0.5 %    Gran # (ANC) 7.5 1.8 - 7.7 K/uL    Immature Grans (Abs) 0.05 (H) 0.00 - 0.04 K/uL    Lymph # 1.8 1.0 - 4.8 K/uL    Mono # 0.7 0.3 - 1.0 K/uL    Eos # 0.2 0.0 - 0.5 K/uL    Baso # 0.04 0.00 - 0.20 K/uL    nRBC 0 0 /100 WBC    Gran % 73.1 (H) 38.0 - 73.0 %    Lymph % 18.0 18.0 - 48.0 %    Mono % 6.5 4.0 - 15.0 %    Eosinophil % 1.5 0.0 - 8.0 %    Basophil % 0.4 0.0 - 1.9 %    Differential Method Automated    Comprehensive metabolic panel   Result Value Ref Range    Sodium 140 136 - 145 mmol/L    Potassium 4.1 3.5 - 5.1 mmol/L    Chloride 106 95  - 110 mmol/L    CO2 18 (L) 23 - 29 mmol/L    Glucose 132 (H) 70 - 110 mg/dL    BUN 23 (H) 6 - 20 mg/dL    Creatinine 1.6 (H) 0.5 - 1.4 mg/dL    Calcium 9.6 8.7 - 10.5 mg/dL    Total Protein 8.2 6.0 - 8.4 g/dL    Albumin 3.9 3.5 - 5.2 g/dL    Total Bilirubin 0.8 0.1 - 1.0 mg/dL    Alkaline Phosphatase 84 55 - 135 U/L    AST 19 10 - 40 U/L    ALT 14 10 - 44 U/L    eGFR 39 (A) >60 mL/min/1.73 m^2    Anion Gap 16 8 - 16 mmol/L   Troponin I #1   Result Value Ref Range    Troponin I <0.006 0.000 - 0.026 ng/mL   Troponin I #2   Result Value Ref Range    Troponin I 0.015 0.000 - 0.026 ng/mL   BNP   Result Value Ref Range     (H) 0 - 99 pg/mL   Lipase   Result Value Ref Range    Lipase 24 4 - 60 U/L   Urinalysis, Reflex to Urine Culture Urine, Clean Catch    Specimen: Urine   Result Value Ref Range    Specimen UA Urine, Clean Catch     Color, UA Yellow Yellow, Straw, Lisbeth    Appearance, UA Clear Clear    pH, UA 6.0 5.0 - 8.0    Specific Gravity, UA 1.020 1.005 - 1.030    Protein, UA 3+ (A) Negative    Glucose, UA Trace (A) Negative    Ketones, UA Negative Negative    Bilirubin (UA) Negative Negative    Occult Blood UA Negative Negative    Nitrite, UA Negative Negative    Urobilinogen, UA Negative <2.0 EU/dL    Leukocytes, UA 2+ (A) Negative   HIV 1/2 Ag/Ab (4th Gen)   Result Value Ref Range    HIV 1/2 Ag/Ab Negative Negative   Hepatitis C Antibody   Result Value Ref Range    Hepatitis C Ab Negative Negative   HCV Virus Hold Specimen   Result Value Ref Range    HEP C Virus Hold Specimen Hold for HCV sendout    Urinalysis Microscopic   Result Value Ref Range    RBC, UA 2 0 - 4 /hpf    WBC, UA 47 (H) 0 - 5 /hpf    WBC Clumps, UA Few (A) None-Rare    Bacteria Many (A) None-Occ /hpf    Squam Epithel, UA 1 /hpf    Hyaline Casts, UA 0 0-1/lpf /lpf    Microscopic Comment SEE COMMENT    Lactic acid, plasma   Result Value Ref Range    Lactate (Lactic Acid) 0.7 0.5 - 2.2 mmol/L        Significant Imaging: X-Ray Chest AP  Portable  Narrative: EXAMINATION:  XR CHEST AP PORTABLE    CLINICAL HISTORY:  Chest Pain;    FINDINGS:  Comparison: None available.    Mediastinal silhouette is enlarged.  The lungs are clear.  Possible small left pleural effusion.  No acute osseous finding.  Impression: Cardiomegaly and possible small left pleural effusion.    Electronically signed by: Nitin Viramontes  Date:    09/27/2023  Time:    07:52  CT Renal Stone Study ABD Pelvis WO  Narrative: EXAMINATION:  CT RENAL STONE STUDY ABD PELVIS WO    CLINICAL HISTORY:  Flank pain, kidney stone suspected;    COMPARISON:  None available.    TECHNIQUE:  Axial CT images were obtained of the abdomen and pelvis.  Iterative reconstruction technique was used. The CT exam was performed using one or more of the following dose reduction techniques- Automated exposure control, adjustment of the mA and/or kV according to patient size, and/or use of iterative reconstructed technique.    FINDINGS:  Heart: Heart is enlarged.    Lung Bases: Mild bibasilar atelectasis/scarring.    Liver: Unremarkable.    Gallbladder: No calcified gallstones.    Bile Ducts: No evidence of dilated ducts.    Pancreas: No mass or peripancreatic fat stranding.    Spleen: Unremarkable.    Adrenals: Unremarkable.    Kidneys/ Ureters: 7 mm calculus at the left ureteral vesicular junction.  Left kidney has areas of scarring and atrophy.  Right kidney has a parenchymal calcification.  No renal collecting system calcification.  No hydronephrosis or hydroureter.    Bladder: Previously described stone at the ureteral vesicular junction.  Bladder is otherwise unremarkable in appearance.    Reproductive organs: There are large bilateral adnexal cystic lesions, the left measures up to 17 cm in the right measures up to approximately 10 cm.    GI Tract/Mesentery: Numerous colonic diverticula.  No CT evidence of acute diverticulitis.  The appendix is normal appearance.    Peritoneal Space:  Unremarkable.    Retroperitoneum: No adenopathy.    Abdominal wall: Unremarkable.    Vasculature: Mild atherosclerotic disease. No aortic aneurysm.    Bones: No acute fracture.  Impression: 1. 7 mm calculus at the left ureteral vesicular junction.  Chronic scarring and atrophy involving the left kidney.  2. Large bilateral ovarian cystic lesions measuring up to 17 cm on the left.  Follow-up with and management by gynecology is recommended.    Electronically signed by: Nitin Viramontes  Date:    09/27/2023  Time:    07:37

## 2023-09-27 NOTE — OP NOTE
Date of Procedure: 09/27/2023    PREOPERATIVE DIAGNOSIS:  left ureteral stone, atrophic left kidney, uti                                                                                                       POSTOPERATIVE DIAGNOSIS:  left ureteral stone, cystitis, ureteritis                            PROCEDURES:     Left ureteroscopy, laser lithotripsy, stone basketing and left ureteral stent placement                                                                                                       SURGEON:  Jaxson Patrick MD                                      Assistants: None    Specimen:  Left ureteral stones for stone analysis    ANESTHESIA:  General endotracheal.                                           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                                                         DRAINS:  6fr x 26 cm left ureteral stent.                      PROCEDURE IN DETAIL:  After informed consent and preoperative antibiotics patient is brought to the operative suite.  After appropriate anesthetic patient was repositioned into dorsal lithotomy.  Genitalia prepped and draped sterilely.  21 Frisian rigid cystoscope was placed  through the urethra into the bladder.  Examination of the bladder revealed cystitis cystica type changes throughout the bladder.  The left ureteral orifice was readily visible with a swollen left ureteral tunnel.  The right ureteral orifice was visually normal.  A 0.38 in glidewire was passed into the left ureteral orifice without difficulty.  His semi rigid ureteroscope was then introduced into the distal ureter where a large yellow colored stone was identified.  A 273 micron holmium laser fiber was used to fragment the stone into about 4 smaller fragments.  The individual fragments were grasped  with a ngage style basket and dropped into the bladder.  One final passage of the ureteroscope to the level of the mid ureter revealed no further ureteral stones however there was bullous changes of the ureter.  A 6 Pashto by 26 cm left ureteral stent was placed.  The bladder was drained and stone fragments were retrieved and sent for stone analysis.  The string from the stent was externalized and cut short.  Patient was awakened taken to recovery room in stable condition.  COMPLICATIONS:  None.

## 2023-09-27 NOTE — ANESTHESIA PREPROCEDURE EVALUATION
2023  Yanci George is a 48 y.o., female.    Patient Active Problem List   Diagnosis    Urinary tract obstruction by kidney stone    UTI (urinary tract infection)    Hypertension    Cysts of both ovaries     Past Surgical History:   Procedure Laterality Date     SECTION N/A        Pre-op Assessment    I have reviewed the Patient Summary Reports.    I have reviewed the NPO Status.   I have reviewed the Medications.     Review of Systems  Anesthesia Hx:  No problems with previous Anesthesia    Social:  Non-Smoker    Hematology/Oncology:  Hematology Normal        Cardiovascular:   Hypertension ECG has been reviewed.    Pulmonary:  Pulmonary Normal    Renal/:   Chronic Renal Disease, ARF renal calculi    Hepatic/GI:  Hepatic/GI Normal    Neurological:  Neurology Normal    Endocrine:  Endocrine Normal  Morbid Obesity / BMI > 40      Physical Exam  General: Well nourished    Airway:  Mallampati: II   Mouth Opening: Normal  TM Distance: Normal  Neck ROM: Normal ROM    Dental:  Intact        Anesthesia Plan  Type of Anesthesia, risks & benefits discussed:    Anesthesia Type: Gen ETT  Intra-op Monitoring Plan: Standard ASA Monitors  Post Op Pain Control Plan: multimodal analgesia  Induction:  IV  Airway Plan: , Post-Induction  Informed Consent: Informed consent signed with the Patient and all parties understand the risks and agree with anesthesia plan.  All questions answered.   ASA Score: 3    Ready For Surgery From Anesthesia Perspective.     .      Chemistry        Component Value Date/Time     2023 0252    K 4.1 2023 0252     2023 0252    CO2 18 (L) 2023 0252    BUN 23 (H) 2023 0252    CREATININE 1.6 (H) 2023 0252     (H) 2023 0252        Component Value Date/Time    CALCIUM 9.6 2023 0252    ALKPHOS 84 2023  0252    AST 19 09/27/2023 0252    ALT 14 09/27/2023 0252    BILITOT 0.8 09/27/2023 0252        Lab Results   Component Value Date    WBC 10.21 09/27/2023    HGB 13.4 09/27/2023    HCT 41.8 09/27/2023    MCV 91 09/27/2023     09/27/2023

## 2023-09-27 NOTE — PLAN OF CARE
Problem: Adult Inpatient Plan of Care  Goal: Plan of Care Review  9/27/2023 1851 by Ana Jordan RN  Outcome: Ongoing, Progressing  9/27/2023 1845 by Ana Jordan RN  Outcome: Ongoing, Progressing  Goal: Patient-Specific Goal (Individualized)  9/27/2023 1851 by Ana Jordan RN  Outcome: Ongoing, Progressing  9/27/2023 1845 by Ana Jordan RN  Outcome: Ongoing, Progressing  Goal: Absence of Hospital-Acquired Illness or Injury  9/27/2023 1851 by Ana Jordan RN  Outcome: Ongoing, Progressing  9/27/2023 1845 by Ana Jordan RN  Outcome: Ongoing, Progressing  Goal: Optimal Comfort and Wellbeing  9/27/2023 1851 by Ana Jordan RN  Outcome: Ongoing, Progressing  9/27/2023 1845 by Ana Jordan RN  Outcome: Ongoing, Progressing  Goal: Readiness for Transition of Care  9/27/2023 1851 by Ana Jordan RN  Outcome: Ongoing, Progressing  9/27/2023 1845 by Ana Jordan RN  Outcome: Ongoing, Progressing     Problem: Bariatric Environmental Safety  Goal: Safety Maintained with Care  9/27/2023 1851 by Ana Jordan, EDEL  Outcome: Ongoing, Progressing  9/27/2023 1845 by Ana Jordan RN  Outcome: Ongoing, Progressing

## 2023-09-27 NOTE — Clinical Note
Diagnosis: UTI (urinary tract infection) [872892]   Admitting Provider:: JORJE KAY [[id number]]   Future Attending Provider: JORJE KAY [[id number]]   Reason for IP Medical Treatment  (Clinical interventions that can only be accomplished in the IP setting? ) :: Patient with 7 mm kidney stone and urinary tract infection   I certify that Inpatient services for greater than or equal to 2 midnights are medically necessary:: Yes   Plans for Post-Acute care--if anticipated (pick the single best option):: A. No post acute care anticipated at this time

## 2023-09-27 NOTE — ASSESSMENT & PLAN NOTE
Taking clonidine and ACE at home  Will cont on hydralazine PRN   Clonidine not first line agent for HTN  Will plan to switch to CCB or thiazide diuretics

## 2023-09-28 VITALS
HEART RATE: 80 BPM | TEMPERATURE: 98 F | WEIGHT: 293 LBS | HEIGHT: 69 IN | OXYGEN SATURATION: 98 % | DIASTOLIC BLOOD PRESSURE: 85 MMHG | BODY MASS INDEX: 43.4 KG/M2 | SYSTOLIC BLOOD PRESSURE: 137 MMHG | RESPIRATION RATE: 16 BRPM

## 2023-09-28 LAB
ANION GAP SERPL CALC-SCNC: 11 MMOL/L (ref 8–16)
BASOPHILS # BLD AUTO: 0.04 K/UL (ref 0–0.2)
BASOPHILS NFR BLD: 0.4 % (ref 0–1.9)
BUN SERPL-MCNC: 20 MG/DL (ref 6–20)
CALCIUM SERPL-MCNC: 8.5 MG/DL (ref 8.7–10.5)
CHLORIDE SERPL-SCNC: 105 MMOL/L (ref 95–110)
CO2 SERPL-SCNC: 22 MMOL/L (ref 23–29)
CREAT SERPL-MCNC: 1.6 MG/DL (ref 0.5–1.4)
DIFFERENTIAL METHOD: ABNORMAL
EOSINOPHIL # BLD AUTO: 0.2 K/UL (ref 0–0.5)
EOSINOPHIL NFR BLD: 1.7 % (ref 0–8)
ERYTHROCYTE [DISTWIDTH] IN BLOOD BY AUTOMATED COUNT: 14.7 % (ref 11.5–14.5)
EST. GFR  (NO RACE VARIABLE): 39 ML/MIN/1.73 M^2
GLUCOSE SERPL-MCNC: 121 MG/DL (ref 70–110)
HCT VFR BLD AUTO: 38.1 % (ref 37–48.5)
HGB BLD-MCNC: 11.7 G/DL (ref 12–16)
IMM GRANULOCYTES # BLD AUTO: 0.04 K/UL (ref 0–0.04)
IMM GRANULOCYTES NFR BLD AUTO: 0.4 % (ref 0–0.5)
LYMPHOCYTES # BLD AUTO: 1.5 K/UL (ref 1–4.8)
LYMPHOCYTES NFR BLD: 16 % (ref 18–48)
MCH RBC QN AUTO: 29.3 PG (ref 27–31)
MCHC RBC AUTO-ENTMCNC: 30.7 G/DL (ref 32–36)
MCV RBC AUTO: 95 FL (ref 82–98)
MONOCYTES # BLD AUTO: 0.5 K/UL (ref 0.3–1)
MONOCYTES NFR BLD: 5.8 % (ref 4–15)
NEUTROPHILS # BLD AUTO: 7.1 K/UL (ref 1.8–7.7)
NEUTROPHILS NFR BLD: 75.7 % (ref 38–73)
NRBC BLD-RTO: 0 /100 WBC
PLATELET # BLD AUTO: 192 K/UL (ref 150–450)
PMV BLD AUTO: 10 FL (ref 9.2–12.9)
POTASSIUM SERPL-SCNC: 4.3 MMOL/L (ref 3.5–5.1)
RBC # BLD AUTO: 4 M/UL (ref 4–5.4)
SODIUM SERPL-SCNC: 138 MMOL/L (ref 136–145)
WBC # BLD AUTO: 9.39 K/UL (ref 3.9–12.7)

## 2023-09-28 PROCEDURE — 63600175 PHARM REV CODE 636 W HCPCS: Performed by: UROLOGY

## 2023-09-28 PROCEDURE — 80048 BASIC METABOLIC PNL TOTAL CA: CPT | Performed by: FAMILY MEDICINE

## 2023-09-28 PROCEDURE — 94799 UNLISTED PULMONARY SVC/PX: CPT

## 2023-09-28 PROCEDURE — 25000003 PHARM REV CODE 250: Performed by: UROLOGY

## 2023-09-28 PROCEDURE — 94761 N-INVAS EAR/PLS OXIMETRY MLT: CPT

## 2023-09-28 PROCEDURE — 85025 COMPLETE CBC W/AUTO DIFF WBC: CPT | Performed by: FAMILY MEDICINE

## 2023-09-28 PROCEDURE — 36415 COLL VENOUS BLD VENIPUNCTURE: CPT | Performed by: FAMILY MEDICINE

## 2023-09-28 PROCEDURE — 99231 PR SUBSEQUENT HOSPITAL CARE,LEVL I: ICD-10-PCS | Mod: ,,, | Performed by: OBSTETRICS & GYNECOLOGY

## 2023-09-28 PROCEDURE — 99231 SBSQ HOSP IP/OBS SF/LOW 25: CPT | Mod: ,,, | Performed by: OBSTETRICS & GYNECOLOGY

## 2023-09-28 RX ORDER — ONDANSETRON 4 MG/1
4 TABLET, FILM COATED ORAL EVERY 6 HOURS
Qty: 12 TABLET | Refills: 0 | Status: SHIPPED | OUTPATIENT
Start: 2023-09-28

## 2023-09-28 RX ORDER — AMLODIPINE BESYLATE 10 MG/1
10 TABLET ORAL DAILY
Qty: 30 TABLET | Refills: 11 | Status: SHIPPED | OUTPATIENT
Start: 2023-09-28 | End: 2024-09-27

## 2023-09-28 RX ORDER — CEFDINIR 300 MG/1
300 CAPSULE ORAL 2 TIMES DAILY
Qty: 14 CAPSULE | Refills: 0 | Status: SHIPPED | OUTPATIENT
Start: 2023-09-28 | End: 2023-10-05

## 2023-09-28 RX ORDER — HYDROCODONE BITARTRATE AND ACETAMINOPHEN 7.5; 325 MG/1; MG/1
1 TABLET ORAL EVERY 6 HOURS PRN
Qty: 15 TABLET | Refills: 0 | Status: SHIPPED | OUTPATIENT
Start: 2023-09-28 | End: 2023-10-03 | Stop reason: SDUPTHER

## 2023-09-28 RX ADMIN — HYDROCODONE BITARTRATE AND ACETAMINOPHEN 1 TABLET: 5; 325 TABLET ORAL at 11:09

## 2023-09-28 RX ADMIN — SODIUM CHLORIDE: 9 INJECTION, SOLUTION INTRAVENOUS at 04:09

## 2023-09-28 RX ADMIN — CEFTRIAXONE 1 G: 1 INJECTION, POWDER, FOR SOLUTION INTRAMUSCULAR; INTRAVENOUS at 04:09

## 2023-09-28 RX ADMIN — HYDROCODONE BITARTRATE AND ACETAMINOPHEN 1 TABLET: 5; 325 TABLET ORAL at 01:09

## 2023-09-28 RX ADMIN — MORPHINE SULFATE 4 MG: 4 INJECTION INTRAVENOUS at 04:09

## 2023-09-28 NOTE — SUBJECTIVE & OBJECTIVE
Interval History: Late entry note.  Pt was seen and examined by me at 12:45 pm today.   Kendall #[id number] translated.  Pt states she knew about her ovarian cysts when she had an ultrasound 5 years ago in her [place].  [place] to [place], pt has been unable to establish care.  States her periods are painful.  Has pelvic and low back pain currently related to her UTI and renal stone.  Otherwise, does not typically have pelvic pain.  Also feels like the vulva is irritated.    Scheduled Meds:   cefTRIAXone (ROCEPHIN) IVPB  1 g Intravenous Q24H     Continuous Infusions:   sodium chloride 0.9% 150 mL/hr at 09/28/23 0435     PRN Meds:acetaminophen, hydrALAZINE, HYDROcodone-acetaminophen, morphine, ondansetron    Review of patient's allergies indicates:  No Known Allergies    Objective:     Vital Signs (Most Recent):  Temp: 98 °F (36.7 °C) (09/28/23 1152)  Pulse: 80 (09/28/23 1152)  Resp: 16 (09/28/23 1152)  BP: 137/85 (09/28/23 1152)  SpO2: 98 % (09/28/23 1152) Vital Signs (24h Range):  Temp:  [97.5 °F (36.4 °C)-98.4 °F (36.9 °C)] 98 °F (36.7 °C)  Pulse:  [67-80] 80  Resp:  [16-18] 16  SpO2:  [90 %-98 %] 98 %  BP: (113-140)/(57-85) 137/85     Weight: (!) 152 kg (335 lb 1.6 oz)  Body mass index is 49.49 kg/m².  Patient's last menstrual period was 08/05/2023.    I&O (Last 24H):    Intake/Output Summary (Last 24 hours) at 9/28/2023 5728  Last data filed at 9/28/2023 1152  Gross per 24 hour   Intake 240 ml   Output 750 ml   Net -510 ml         Laboratory:  I have personallly reviewed all pertinent lab results from the last 24 hours.    Diagnostic Results:  US: Reviewed     Physical Exam:   Constitutional: She is oriented to person, place, and time. She appears well-developed and well-nourished. No distress.        Pulmonary/Chest: No respiratory distress.          Genitourinary: There is no rash, tenderness, lesion or injury on the right labia. There is no rash, tenderness, lesion or injury on the  left labia.               Neurological: She is alert and oriented to person, place, and time.          Review of Systems

## 2023-09-28 NOTE — DISCHARGE SUMMARY
[facility] Medicine  Discharge Summary      Patient Name: Yanci George  MRN: [id number]  [facility]: [id number]  Patient Class: IP- Inpatient  Admission Date: 9/27/2023  Hospital Length of Stay: 1 days  Discharge Date and Time:  09/28/2023 1:18 PM  Attending Physician: Brian Arana MD   Discharging Provider: Brian Arana MD  Primary Care Provider: Cassia Primary Doctor    Primary Care Team: [facility] MEDICINE D    HPI:   Patient is a 48 y.o. Malay speaking female with a PMHx of HTN who presents to the Emergency Department for evaluation of LLQ abdominal pain that radiates to the lower left back which onset a few days PTA. Patient reports having kidney stones and UTI in the past. Symptoms feels similar to previous stones. Associated symptoms include nausea/vomiting, dysuria. Pain has been progressively worsening. Denies any fever, chest pain, sob.     In the ED, CT stone protocol was concerning for 7mm stone in left ureteral vesicular junction along with large bilateral ovarian cysts. Urology consulted in the ed and plans for surgery today. U/A indicative of UTI and patient was started on rocephin.         Procedure(s) (LRB):  CYSTOURETEROSCOPY, WITH HOLMIUM LASER LITHOTRIPSY OF URETERAL CALCULUS AND STENT INSERTION (Left)      Hospital Course:   Patient was admitted for obstructed renal stone. Urology consulted. Lithotripsy with stent placement performed. OBGYN consulted for bilateral ovarian cysts. Pelvic U/S performed, will need outpatient f/u with OBGYN. Patient cleared for dc with f/u with urology on Monday for stent removal. She was discharged with norvasc, cefdinir, and norco.        Goals of Care Treatment Preferences:  Code Status: Full Code      Consults:   Consults (From admission, onward)        Status Ordering Provider     Inpatient consult to OB/GYN  Once        Provider:  Lashon Sood MD    Acknowledged DEBRA MCKINNEY     Inpatient consult to Urology  Once         Provider:  (Not yet assigned)    Completed JORJE KAY          No new Assessment & Plan notes have been filed under this hospital service since the last note was generated.  Service: Hospital Medicine    Final Active Diagnoses:    Diagnosis Date Noted POA    PRINCIPAL PROBLEM:  Urinary tract obstruction by kidney stone [N20.0, N13.8] 09/27/2023 Yes    UTI (urinary tract infection) [N39.0] 09/27/2023 Yes    Hypertension [I10] 09/27/2023 Yes    Cysts of both ovaries [N83.201, N83.202] 09/27/2023 Yes    Ureteral stone [N20.1] 09/27/2023 Yes    Lower abdominal pain [R10.30] 09/27/2023 Yes      Problems Resolved During this Admission:       Discharged Condition: good    Disposition: Home or Self Care    Follow Up:   Follow-up Information     Please follow up.    Contact information:  Follow-up with your primary care doctor in 1-2 days for recheck           Jaxson Patrick MD Follow up on 10/2/2023.    Specialty: Urology  Contact information:  [address]  [place] [address] [phone]                       Patient Instructions:      Ambulatory referral/consult to Obstetrics / Gynecology   Standing Status: Future   Referral Priority: Routine Referral Type: Consultation   Referral Reason: Specialty Services Required   Requested Specialty: Obstetrics and Gynecology   Number of Visits Requested: 1       Significant Diagnostic Studies: Labs:   BMP:   Recent Labs   Lab 09/27/23  0252 09/28/23  0937   * 121*    138   K 4.1 4.3    105   CO2 18* 22*   BUN 23* 20   CREATININE 1.6* 1.6*   CALCIUM 9.6 8.5*    and CBC   Recent Labs   Lab 09/27/23  0252 09/28/23  0937   WBC 10.21 9.39   HGB 13.4 11.7*   HCT 41.8 38.1    192       Pending Diagnostic Studies:     Procedure Component Value Units Date/Time    Specimen to Pathology, Surgery Urology [[id number]] Collected: 09/27/23 1421    Order Status: Sent Lab Status: In process Updated: 09/28/23 0932    Specimen: Tissue     Urinary Stone  Analysis [[id number]] Collected: 09/27/23 1421    Order Status: Sent Lab Status: In process Updated: 09/28/23 1100    Specimen: Stone          Medications:  Reconciled Home Medications:      Medication List      START taking these medications    amLODIPine 10 MG tablet  Commonly known as: NORVASC  Dupuyer joanna tableta (10 mg en total) por vía oral diariamente.  (Take 1 tablet (10 mg total) by mouth once daily.)     cefdinir 300 MG capsule  Commonly known as: OMNICEF  Take 1 capsule (300 mg total) by mouth 2 (two) times daily for 7 days     HYDROcodone-acetaminophen 7.5-325 mg per tablet  Commonly known as: NORCO  Dupuyer joanna tableta por vía oral cada 6 horas según sea necesario para el dolor.  (Take 1 tablet by mouth every 6 (six) hours as needed for Pain.)     ondansetron 4 MG tablet  Commonly known as: ZOFRAN  Take 1 tablet (4 mg total) by mouth every 6 (six) hours.        CHANGE how you take these medications    cloNIDine 0.1 MG tablet  Commonly known as: CATAPRES  Take 1 tablet (0.1 mg total) by mouth 2 (two) times daily.  What changed: when to take this            Indwelling Lines/Drains at time of discharge:   Lines/Drains/Airways     None                 Time spent on the discharge of patient: 36 minutes         Brian Arana MD  Department of Hospital Medicine  [facility] - Tele[facility] ([facility])

## 2023-09-28 NOTE — PLAN OF CARE
Problem: Adult Inpatient Plan of Care  Goal: Plan of Care Review  Outcome: Ongoing, Progressing  Goal: Optimal Comfort and Wellbeing  Outcome: Ongoing, Progressing     Problem: Bariatric Environmental Safety  Goal: Safety Maintained with Care  Outcome: Ongoing, Progressing     Problem: Pain Acute  Goal: Acceptable Pain Control and Functional Ability  Outcome: Ongoing, Progressing     Problem: Fall Injury Risk  Goal: Absence of Fall and Fall-Related Injury  Outcome: Ongoing, Progressing

## 2023-09-28 NOTE — CONSULTS
[facility]  Obstetrics & Gynecology  Consult Note    Patient Name: Yanci George  MRN: [id number]  Admission Date: 2023  Hospital Length of Stay: 0 days  Code Status: Full Code  Primary Care Provider: No primary care provider on file.  Principal Problem: Urinary tract obstruction by kidney stone    Consults  Subjective:     Chief Complaint: LLQ abdominal pain    History of Present Illness:  48 y.o. female who presented to ED with c/o acute onset of LLQ abdominal pain for past several days.  Also c/o nausea, vomiting, and dysuria.  Patient reports longstanding history of kidney stones.  She also reports being diagnosed with bilateral ovarian cysts around 5 years ago.  CT scan today shows left renal stone, as well as bilateral ovarian cysts.  Consult requested due to ovarian cysts.    History obtained from patient and from medical record        OB History   No obstetric history on file.     Past Medical History:   Diagnosis Date    Hypertension      Past Surgical History:   Procedure Laterality Date     SECTION N/A        No medications prior to admission.       Review of patient's allergies indicates:  No Known Allergies     Family History    None       Tobacco Use    Smoking status: Never    Smokeless tobacco: Never   Substance and Sexual Activity    Alcohol use: Not on file    Drug use: Never    Sexual activity: Not on file     Review of Systems   Constitutional:  Negative for chills and fever.   Respiratory:  Negative for cough and shortness of breath.    Cardiovascular:  Negative for chest pain.   Gastrointestinal:  Positive for abdominal pain, nausea and vomiting.   Genitourinary:  Positive for dysuria. Negative for menorrhagia, pelvic pain and vaginal bleeding.      Objective:     Vital Signs (Most Recent):  Temp: 98.4 °F (36.9 °C) (23)  Pulse: 67 (23)  Resp: 17 (23)  BP: (!) 140/73 (23)  SpO2: 95 % (23)  Vital Signs (24h Range):  Temp:  [97.7 °F (36.5 °C)-99.7 °F (37.6 °C)] 98.4 °F (36.9 °C)  Pulse:  [] 67  Resp:  [7-28] 17  SpO2:  [94 %-100 %] 95 %  BP: (119-205)/(57-84) 140/73     Weight: (!) 152 kg (335 lb 1.6 oz)  Body mass index is 49.49 kg/m².    Patient's last menstrual period was 08/05/2023.     Physical Exam:   Constitutional: She is oriented to person, place, and time. She appears well-developed and well-nourished. No distress.    HENT:   Head: Normocephalic and atraumatic.      Cardiovascular:  Normal rate and regular rhythm.             Pulmonary/Chest: Effort normal.        Abdominal: Soft. She exhibits no distension.   Mild LLQ tenderness present.             Musculoskeletal: No tenderness or edema.       Neurological: She is alert and oriented to person, place, and time.    Skin: Skin is warm and dry.    Psychiatric: She has a normal mood and affect.        Laboratory:  CBC:   Recent Labs   Lab 09/27/23  0252   WBC 10.21   RBC 4.58   HGB 13.4   HCT 41.8      MCV 91   MCH 29.3   MCHC 32.1     CMP:   Recent Labs   Lab 09/27/23  0252   *   CALCIUM 9.6   ALBUMIN 3.9   PROT 8.2      K 4.1   CO2 18*      BUN 23*   CREATININE 1.6*   ALKPHOS 84   ALT 14   AST 19   BILITOT 0.8     Recent Labs   Lab 09/27/23  0248   COLORU Yellow   SPECGRAV 1.020   PHUR 6.0   PROTEINUA 3+*   BACTERIA Many*   NITRITE Negative   LEUKOCYTESUR 2+*   UROBILINOGEN Negative   HYALINECASTS 0       Diagnostic Results:  CT: Reviewed  Narrative & Impression  EXAMINATION:  CT RENAL STONE STUDY ABD PELVIS WO     CLINICAL HISTORY:  Flank pain, kidney stone suspected;     COMPARISON:  None available.     TECHNIQUE:  Axial CT images were obtained of the abdomen and pelvis.  Iterative reconstruction technique was used. The CT exam was performed using one or more of the following dose reduction techniques- Automated exposure control, adjustment of the mA and/or kV according to patient size, and/or use of iterative  reconstructed technique.     FINDINGS:  Heart: Heart is enlarged.     Lung Bases: Mild bibasilar atelectasis/scarring.     Liver: Unremarkable.     Gallbladder: No calcified gallstones.     Bile Ducts: No evidence of dilated ducts.     Pancreas: No mass or peripancreatic fat stranding.     Spleen: Unremarkable.     Adrenals: Unremarkable.     Kidneys/ Ureters: 7 mm calculus at the left ureteral vesicular junction.  Left kidney has areas of scarring and atrophy.  Right kidney has a parenchymal calcification.  No renal collecting system calcification.  No hydronephrosis or hydroureter.     Bladder: Previously described stone at the ureteral vesicular junction.  Bladder is otherwise unremarkable in appearance.     Reproductive organs: There are large bilateral adnexal cystic lesions, the left measures up to 17 cm in the right measures up to approximately 10 cm.     GI Tract/Mesentery: Numerous colonic diverticula.  No CT evidence of acute diverticulitis.  The appendix is normal appearance.     Peritoneal Space: Unremarkable.     Retroperitoneum: No adenopathy.     Abdominal wall: Unremarkable.     Vasculature: Mild atherosclerotic disease. No aortic aneurysm.     Bones: No acute fracture.     Impression:     1. 7 mm calculus at the left ureteral vesicular junction.  Chronic scarring and atrophy involving the left kidney.  2. Large bilateral ovarian cystic lesions measuring up to 17 cm on the left.  Follow-up with and management by gynecology is recommended.        Electronically signed by: Nitin Viramontes  Date:                                            09/27/2023  Time:                                           07:37    Assessment/Plan:     Renal/  * Urinary tract obstruction by kidney stone  S/P left ureteroscopy with stone retrieval and left ureteral stent placement by urology today.    Cysts of both ovaries  Patient states she has had longstanding ovarian cysts.  Recommend pelvic u/s for further evaluation of the  cysts.    UTI (urinary tract infection)  Currently on Rocephin for UTI treatment    GI  Lower abdominal pain  LLQ pain - renal stone vs ovarian cysts  Recommend pelvic u/s for further evaluation        Thank you for your consult. I will follow-up with patient. Please contact us if you have any additional questions.    Lashon Sood MD  Obstetrics & Gynecology  [facility] ([facility])

## 2023-09-28 NOTE — PLAN OF CARE
[facility] (H[facility])  Discharge Final Note    Primary Care Provider: No, Primary Doctor    Expected Discharge Date: 9/28/2023    Final Discharge Note (most recent)       Final Note - 09/28/23 1417          Final Note    Assessment Type Final Discharge Note     Anticipated Discharge Disposition Home or Self Care     Hospital Resources/Appts/Education Provided Appointments scheduled and added to AVS        Post-Acute Status    Discharge Delays None known at this time                     Important Message from Medicare             Contact Info           Follow-up with your primary care doctor in 1-2 days for recheck       Next Steps: Follow up    Jaxson Patrick MD   Specialty: Urology    [address]  [place] [address]   Phone: [phone]       Next Steps: Follow up on 10/2/2023          DC Disposition: home with family  Family Notified: Patient by nurse  Transportation: personal transportation    Patient needed Urology appointment scheduled with Jaxson Patrick MD. DERIC schedule Patient appointment with Dr. Patrick, Monday October 2, 4 pm.     Patient also needed OBGYN referral for ovarian cysts. Patient needed appointment set up with [facility] at [facility]. DERIC contacted [facility] and left  for a call back to schedule appointment.

## 2023-09-28 NOTE — SUBJECTIVE & OBJECTIVE
OB History   No obstetric history on file.     Past Medical History:   Diagnosis Date    Hypertension      Past Surgical History:   Procedure Laterality Date     SECTION N/A        No medications prior to admission.       Review of patient's allergies indicates:  No Known Allergies     Family History    None       Tobacco Use    Smoking status: Never    Smokeless tobacco: Never   Substance and Sexual Activity    Alcohol use: Not on file    Drug use: Never    Sexual activity: Not on file     Review of Systems   Constitutional:  Negative for chills and fever.   Respiratory:  Negative for cough and shortness of breath.    Cardiovascular:  Negative for chest pain.   Gastrointestinal:  Positive for abdominal pain, nausea and vomiting.   Genitourinary:  Positive for dysuria. Negative for menorrhagia, pelvic pain and vaginal bleeding.      Objective:     Vital Signs (Most Recent):  Temp: 98.4 °F (36.9 °C) (23)  Pulse: 67 (23)  Resp: 17 (23)  BP: (!) 140/73 (23)  SpO2: 95 % (23) Vital Signs (24h Range):  Temp:  [97.7 °F (36.5 °C)-99.7 °F (37.6 °C)] 98.4 °F (36.9 °C)  Pulse:  [] 67  Resp:  [7-28] 17  SpO2:  [94 %-100 %] 95 %  BP: (119-205)/(57-84) 140/73     Weight: (!) 152 kg (335 lb 1.6 oz)  Body mass index is 49.49 kg/m².    Patient's last menstrual period was 2023.     Physical Exam:   Constitutional: She is oriented to person, place, and time. She appears well-developed and well-nourished. No distress.    HENT:   Head: Normocephalic and atraumatic.      Cardiovascular:  Normal rate and regular rhythm.             Pulmonary/Chest: Effort normal.        Abdominal: Soft. She exhibits no distension.   Mild LLQ tenderness present.             Musculoskeletal: No tenderness or edema.       Neurological: She is alert and oriented to person, place, and time.    Skin: Skin is warm and dry.    Psychiatric: She has a normal mood and affect.         Laboratory:  CBC:   Recent Labs   Lab 09/27/23  0252   WBC 10.21   RBC 4.58   HGB 13.4   HCT 41.8      MCV 91   MCH 29.3   MCHC 32.1     CMP:   Recent Labs   Lab 09/27/23  0252   *   CALCIUM 9.6   ALBUMIN 3.9   PROT 8.2      K 4.1   CO2 18*      BUN 23*   CREATININE 1.6*   ALKPHOS 84   ALT 14   AST 19   BILITOT 0.8     Recent Labs   Lab 09/27/23  0248   COLORU Yellow   SPECGRAV 1.020   PHUR 6.0   PROTEINUA 3+*   BACTERIA Many*   NITRITE Negative   LEUKOCYTESUR 2+*   UROBILINOGEN Negative   HYALINECASTS 0       Diagnostic Results:  CT: Reviewed  Narrative & Impression  EXAMINATION:  CT RENAL STONE STUDY ABD PELVIS WO     CLINICAL HISTORY:  Flank pain, kidney stone suspected;     COMPARISON:  None available.     TECHNIQUE:  Axial CT images were obtained of the abdomen and pelvis.  Iterative reconstruction technique was used. The CT exam was performed using one or more of the following dose reduction techniques- Automated exposure control, adjustment of the mA and/or kV according to patient size, and/or use of iterative reconstructed technique.     FINDINGS:  Heart: Heart is enlarged.     Lung Bases: Mild bibasilar atelectasis/scarring.     Liver: Unremarkable.     Gallbladder: No calcified gallstones.     Bile Ducts: No evidence of dilated ducts.     Pancreas: No mass or peripancreatic fat stranding.     Spleen: Unremarkable.     Adrenals: Unremarkable.     Kidneys/ Ureters: 7 mm calculus at the left ureteral vesicular junction.  Left kidney has areas of scarring and atrophy.  Right kidney has a parenchymal calcification.  No renal collecting system calcification.  No hydronephrosis or hydroureter.     Bladder: Previously described stone at the ureteral vesicular junction.  Bladder is otherwise unremarkable in appearance.     Reproductive organs: There are large bilateral adnexal cystic lesions, the left measures up to 17 cm in the right measures up to approximately 10 cm.     GI  Tract/Mesentery: Numerous colonic diverticula.  No CT evidence of acute diverticulitis.  The appendix is normal appearance.     Peritoneal Space: Unremarkable.     Retroperitoneum: No adenopathy.     Abdominal wall: Unremarkable.     Vasculature: Mild atherosclerotic disease. No aortic aneurysm.     Bones: No acute fracture.     Impression:     1. 7 mm calculus at the left ureteral vesicular junction.  Chronic scarring and atrophy involving the left kidney.  2. Large bilateral ovarian cystic lesions measuring up to 17 cm on the left.  Follow-up with and management by gynecology is recommended.        Electronically signed by: Nitin Viramontes  Date:                                            09/27/2023  Time:                                           07:37

## 2023-09-28 NOTE — ASSESSMENT & PLAN NOTE
Patient states she has had longstanding ovarian cysts.  Ultrasound with large, bilateral simple cysts.  Reviewed findings with the patient.  Overall benign appearance, but will need outpatient follow-up for possible surgical management in the future.  Can establish care with our practice, or can follow-up with [facility].

## 2023-09-28 NOTE — HOSPITAL COURSE
Patient underwent left ureteroscopy with stone removal and left ureteral stent placement by urology on 9/27.    Pelvic ultrasound:  Transabdominal pelvic ultrasound was performed.     The uterus appears normal.  it measures 13 cm in length.  The endometrium measures 8 mm in thickness.     Large simple cystic structure related to the right ovary measuring 8.5 x 8.7 x 10.8 cm.     Large simple cystic structure involving the left ovary measuring 18.3 x 9.5 x 16.8 cm.  Two other smaller cysts are present measuring 3 cm.  No significant free fluid.     No abnormal masses or fluid collections.

## 2023-09-28 NOTE — ASSESSMENT & PLAN NOTE
Patient states she has had longstanding ovarian cysts.  Recommend pelvic u/s for further evaluation of the cysts.

## 2023-09-28 NOTE — HPI
48 y.o. female who presented to ED with c/o acute onset of LLQ abdominal pain for past several days.  Also c/o nausea, vomiting, and dysuria.  Patient reports longstanding history of kidney stones.  She also reports being diagnosed with bilateral ovarian cysts around 5 years ago.  CT scan today shows left renal stone, as well as bilateral ovarian cysts.  Consult requested due to ovarian cysts.

## 2023-09-28 NOTE — HOSPITAL COURSE
Patient was admitted for obstructed renal stone. Urology consulted. Lithotripsy with stent placement performed. OBGYN consulted for bilateral ovarian cysts. Pelvic U/S performed, will need outpatient f/u with OBGYN. Patient cleared for dc with f/u with urology on Monday for stent removal. She was discharged with norvasc, cefdinir, and norco.

## 2023-09-28 NOTE — PLAN OF CARE
[facility] (Hospital)  Initial Discharge Assessment       Primary Care Provider: No, Primary Doctor    Admission Diagnosis: Kidney stone [N20.0]  Ureteral stone [N20.1]  UTI (urinary tract infection) [N39.0]  Preoperative clearance [Z01.818]  Lower abdominal pain [R10.30]  Acute kidney injury [N17.9]  Urinary tract infection with hematuria, site unspecified [N39.0, R31.9]  Left ureteral stone [N20.1]  Hypertension, unspecified type [I10]    Admission Date: 9/27/2023  Expected Discharge Date: 9/28/2023    Transition of Care Barriers: None    Payor: /     No emergency contact information on file.    Discharge Plan A: Home with family       No Pharmacies Listed    Initial Assessment (most recent)       Adult Discharge Assessment - 09/28/23 1322          Discharge Assessment    Assessment Type Discharge Planning Assessment     Confirmed/corrected address, phone number and insurance Yes     Confirmed Demographics Correct on Facesheet     Source of Information patient;family     Communicated MAGAN with patient/caregiver Date not available/Unable to determine     Reason For Admission Urinary tract obstruction by kidney stone     People in Home child(phan), adult     Facility Arrived From: home     Do you expect to return to your current living situation? Yes     Do you have help at home or someone to help you manage your care at home? Yes     Who are your caregiver(s) and their phone number(s)? daughter     Prior to hospitilization cognitive status: Alert/Oriented     Current cognitive status: Alert/Oriented     Walking or Climbing Stairs --   independent    Dressing/Bathing --   independent    Home Accessibility wheelchair accessible     Equipment Currently Used at Home none     Readmission within 30 days? No     Patient currently being followed by outpatient case management? No     Do you currently have service(s) that help you manage your care at home? No     Do you take prescription medications? Yes     Do you  have prescription coverage? No     Do you have any problems affording any of your prescribed medications? Yes     Is the patient taking medications as prescribed? yes     Who is going to help you get home at discharge? daughter     How do you get to doctors appointments? car, drives self     Are you on dialysis? No     Do you take coumadin? No     DME Needed Upon Discharge  none     Discharge Plan discussed with: Patient     Transition of Care Barriers None     Discharge Plan A Home with family                   Anticipated DC dispo: home with family   Prior Level of Function: independent with ADLs  People in home: Patient's daughter    Comments:  SW met with patient and family member, who acted as , at bedside to introduce role and discuss discharge planning. Patient's daughter will be help at home and can provide transport at time of discharge. Confirmed demographics, insurance, and emergency contacts.  CM discharge needs depends on hospital progress. SW will continue following to assist with other needs.

## 2023-09-28 NOTE — PROGRESS NOTES
[facility])  Obstetrics & Gynecology  Progress Note    Patient Name: Yanci George  MRN: [id number]  Admission Date: 9/27/2023  Primary Care Provider: Cassia, Primary Doctor  Principal Problem: Urinary tract obstruction by kidney stone    Subjective:     HPI:  48 y.o. female who presented to ED with c/o acute onset of LLQ abdominal pain for past several days.  Also c/o nausea, vomiting, and dysuria.  Patient reports longstanding history of kidney stones.  She also reports being diagnosed with bilateral ovarian cysts around 5 years ago.  CT scan today shows left renal stone, as well as bilateral ovarian cysts.  Consult requested due to ovarian cysts.      Interval History: Late entry note.  Pt was seen and examined by me at 12:45 pm today.   Kendall #[id number] translated.  Pt states she knew about her ovarian cysts when she had an ultrasound 5 years ago in her [place].  Since arriving to the [place], pt has been unable to establish care.  States her periods are painful.  Has pelvic and low back pain currently related to her UTI and renal stone.  Otherwise, does not typically have pelvic pain.  Also feels like the vulva is irritated.    Scheduled Meds:   cefTRIAXone (ROCEPHIN) IVPB  1 g Intravenous Q24H     Continuous Infusions:   sodium chloride 0.9% 150 mL/hr at 09/28/23 0435     PRN Meds:acetaminophen, hydrALAZINE, HYDROcodone-acetaminophen, morphine, ondansetron    Review of patient's allergies indicates:  No Known Allergies    Objective:     Vital Signs (Most Recent):  Temp: 98 °F (36.7 °C) (09/28/23 1152)  Pulse: 80 (09/28/23 1152)  Resp: 16 (09/28/23 1152)  BP: 137/85 (09/28/23 1152)  SpO2: 98 % (09/28/23 1152) Vital Signs (24h Range):  Temp:  [97.5 °F (36.4 °C)-98.4 °F (36.9 °C)] 98 °F (36.7 °C)  Pulse:  [67-80] 80  Resp:  [16-18] 16  SpO2:  [90 %-98 %] 98 %  BP: (113-140)/(57-85) 137/85     Weight: (!) 152 kg (335 lb 1.6 oz)  Body mass index is 49.49  kg/m².  Patient's last menstrual period was 08/05/2023.    I&O (Last 24H):    Intake/Output Summary (Last 24 hours) at 9/28/2023 1738  Last data filed at 9/28/2023 1152  Gross per 24 hour   Intake 240 ml   Output 750 ml   Net -510 ml         Laboratory:  I have personallly reviewed all pertinent lab results from the last 24 hours.    Diagnostic Results:  US: Reviewed     Physical Exam:   Constitutional: She is oriented to person, place, and time. She appears well-developed and well-nourished. No distress.        Pulmonary/Chest: No respiratory distress.          Genitourinary: There is no rash, tenderness, lesion or injury on the right labia. There is no rash, tenderness, lesion or injury on the left labia.               Neurological: She is alert and oriented to person, place, and time.          Review of Systems      Assessment/Plan:     * Urinary tract obstruction by kidney stone  S/P left ureteroscopy with stone retrieval and left ureteral stent placement by urology today.    Lower abdominal pain  LLQ pain - renal stone vs ovarian cysts  Recommend pelvic u/s for further evaluation    Cysts of both ovaries  Patient states she has had longstanding ovarian cysts.  Ultrasound with large, bilateral simple cysts.  Reviewed findings with the patient.  Overall benign appearance, but will need outpatient follow-up for possible surgical management in the future.  Can establish care with our practice, or can follow-up with [facility].    UTI (urinary tract infection)  Currently on Rocephin for UTI treatment        Dulce Maria Alvarenga MD  Obstetrics & Gynecology  [facility] ([facility])

## 2023-09-29 LAB — BACTERIA UR CULT: ABNORMAL

## 2023-10-02 ENCOUNTER — HOSPITAL ENCOUNTER (EMERGENCY)
Facility: HOSPITAL | Age: 48
Discharge: HOME | End: 2023-10-03
Attending: EMERGENCY MEDICINE
Payer: MEDICAID

## 2023-10-02 DIAGNOSIS — R31.9 URINARY TRACT INFECTION WITH HEMATURIA, SITE UNSPECIFIED: Primary | ICD-10-CM

## 2023-10-02 DIAGNOSIS — I10 HYPERTENSION, UNSPECIFIED TYPE: ICD-10-CM

## 2023-10-02 DIAGNOSIS — N39.0 URINARY TRACT INFECTION WITH HEMATURIA, SITE UNSPECIFIED: Primary | ICD-10-CM

## 2023-10-02 PROCEDURE — 99284 EMERGENCY DEPT VISIT MOD MDM: CPT | Mod: 25

## 2023-10-02 NOTE — FIRST PROVIDER EVALUATION
Medical screening examination initiated.  I have conducted a focused provider triage encounter, findings are as follows:    Brief history of present illness:  Patient presents to ER to have stent removal after cystoureteroscopy with lithotripsy and stent insertion on 09/27/2023.      Vitals:    10/02/23 1645   BP: (!) 189/72   BP Location: Right arm   Patient Position: Sitting   Pulse: 90   Resp: 18   Temp: 98.9 °F (37.2 °C)   TempSrc: Oral   SpO2: 96%   Weight: (!) 153.2 kg (337 lb 11.9 oz)       Pertinent physical exam: no acute distress    Brief workup plan:  ED bed for further evaluation.    Preliminary workup initiated; this workup will be continued and followed by the physician or advanced practice provider that is assigned to the patient when roomed.

## 2023-10-03 ENCOUNTER — TELEPHONE (OUTPATIENT)
Dept: UROLOGY | Facility: CLINIC | Age: 48
End: 2023-10-03
Payer: MEDICAID

## 2023-10-03 VITALS
DIASTOLIC BLOOD PRESSURE: 61 MMHG | OXYGEN SATURATION: 98 % | RESPIRATION RATE: 18 BRPM | HEART RATE: 84 BPM | BODY MASS INDEX: 49.88 KG/M2 | TEMPERATURE: 99 F | SYSTOLIC BLOOD PRESSURE: 131 MMHG | WEIGHT: 293 LBS

## 2023-10-03 LAB
ALBUMIN SERPL BCP-MCNC: 3.4 G/DL (ref 3.5–5.2)
ALP SERPL-CCNC: 71 U/L (ref 55–135)
ALT SERPL W/O P-5'-P-CCNC: 15 U/L (ref 10–44)
ANION GAP SERPL CALC-SCNC: 10 MMOL/L (ref 8–16)
AST SERPL-CCNC: 14 U/L (ref 10–40)
BACTERIA #/AREA URNS HPF: ABNORMAL /HPF
BASOPHILS # BLD AUTO: 0.04 K/UL (ref 0–0.2)
BASOPHILS NFR BLD: 0.5 % (ref 0–1.9)
BILIRUB SERPL-MCNC: 0.6 MG/DL (ref 0.1–1)
BILIRUB UR QL STRIP: NEGATIVE
BUN SERPL-MCNC: 13 MG/DL (ref 6–20)
CALCIUM SERPL-MCNC: 8.7 MG/DL (ref 8.7–10.5)
CHLORIDE SERPL-SCNC: 106 MMOL/L (ref 95–110)
CLARITY UR: CLEAR
CO2 SERPL-SCNC: 27 MMOL/L (ref 23–29)
COLOR UR: YELLOW
CREAT SERPL-MCNC: 1.4 MG/DL (ref 0.5–1.4)
DIFFERENTIAL METHOD: ABNORMAL
EOSINOPHIL # BLD AUTO: 0.4 K/UL (ref 0–0.5)
EOSINOPHIL NFR BLD: 4.4 % (ref 0–8)
ERYTHROCYTE [DISTWIDTH] IN BLOOD BY AUTOMATED COUNT: 14.6 % (ref 11.5–14.5)
EST. GFR  (NO RACE VARIABLE): 46 ML/MIN/1.73 M^2
GLUCOSE SERPL-MCNC: 99 MG/DL (ref 70–110)
GLUCOSE UR QL STRIP: ABNORMAL
HCT VFR BLD AUTO: 37.8 % (ref 37–48.5)
HGB BLD-MCNC: 12.2 G/DL (ref 12–16)
HGB UR QL STRIP: ABNORMAL
HYALINE CASTS #/AREA URNS LPF: 0 /LPF
IMM GRANULOCYTES # BLD AUTO: 0.04 K/UL (ref 0–0.04)
IMM GRANULOCYTES NFR BLD AUTO: 0.5 % (ref 0–0.5)
KETONES UR QL STRIP: NEGATIVE
LEUKOCYTE ESTERASE UR QL STRIP: ABNORMAL
LIPASE SERPL-CCNC: 13 U/L (ref 4–60)
LYMPHOCYTES # BLD AUTO: 2.2 K/UL (ref 1–4.8)
LYMPHOCYTES NFR BLD: 26.3 % (ref 18–48)
MCH RBC QN AUTO: 30 PG (ref 27–31)
MCHC RBC AUTO-ENTMCNC: 32.3 G/DL (ref 32–36)
MCV RBC AUTO: 93 FL (ref 82–98)
MICROSCOPIC COMMENT: ABNORMAL
MONOCYTES # BLD AUTO: 0.6 K/UL (ref 0.3–1)
MONOCYTES NFR BLD: 6.8 % (ref 4–15)
NEUTROPHILS # BLD AUTO: 5.1 K/UL (ref 1.8–7.7)
NEUTROPHILS NFR BLD: 61.5 % (ref 38–73)
NITRITE UR QL STRIP: NEGATIVE
NRBC BLD-RTO: 0 /100 WBC
PH UR STRIP: 7 [PH] (ref 5–8)
PLATELET # BLD AUTO: 217 K/UL (ref 150–450)
PMV BLD AUTO: 10.1 FL (ref 9.2–12.9)
POTASSIUM SERPL-SCNC: 3.5 MMOL/L (ref 3.5–5.1)
PROT SERPL-MCNC: 7.3 G/DL (ref 6–8.4)
PROT UR QL STRIP: ABNORMAL
RBC # BLD AUTO: 4.07 M/UL (ref 4–5.4)
RBC #/AREA URNS HPF: 34 /HPF (ref 0–4)
SODIUM SERPL-SCNC: 143 MMOL/L (ref 136–145)
SP GR UR STRIP: 1.01 (ref 1–1.03)
URN SPEC COLLECT METH UR: ABNORMAL
UROBILINOGEN UR STRIP-ACNC: NEGATIVE EU/DL
WBC # BLD AUTO: 8.22 K/UL (ref 3.9–12.7)
WBC #/AREA URNS HPF: 13 /HPF (ref 0–5)
WBC CLUMPS URNS QL MICRO: ABNORMAL

## 2023-10-03 PROCEDURE — 96374 THER/PROPH/DIAG INJ IV PUSH: CPT

## 2023-10-03 PROCEDURE — 83690 ASSAY OF LIPASE: CPT | Performed by: EMERGENCY MEDICINE

## 2023-10-03 PROCEDURE — 96361 HYDRATE IV INFUSION ADD-ON: CPT

## 2023-10-03 PROCEDURE — 25000003 PHARM REV CODE 250: Performed by: EMERGENCY MEDICINE

## 2023-10-03 PROCEDURE — 85025 COMPLETE CBC W/AUTO DIFF WBC: CPT | Performed by: EMERGENCY MEDICINE

## 2023-10-03 PROCEDURE — 80053 COMPREHEN METABOLIC PANEL: CPT | Performed by: EMERGENCY MEDICINE

## 2023-10-03 PROCEDURE — 81000 URINALYSIS NONAUTO W/SCOPE: CPT | Performed by: EMERGENCY MEDICINE

## 2023-10-03 PROCEDURE — 87086 URINE CULTURE/COLONY COUNT: CPT | Performed by: EMERGENCY MEDICINE

## 2023-10-03 PROCEDURE — 63600175 PHARM REV CODE 636 W HCPCS: Performed by: EMERGENCY MEDICINE

## 2023-10-03 RX ORDER — ONDANSETRON 2 MG/ML
4 INJECTION INTRAMUSCULAR; INTRAVENOUS
Status: DISCONTINUED | OUTPATIENT
Start: 2023-10-03 | End: 2023-10-03 | Stop reason: HOSPADM

## 2023-10-03 RX ORDER — HYDROCODONE BITARTRATE AND ACETAMINOPHEN 7.5; 325 MG/1; MG/1
1 TABLET ORAL EVERY 6 HOURS PRN
Qty: 15 TABLET | Refills: 0 | OUTPATIENT
Start: 2023-10-03 | End: 2023-10-04

## 2023-10-03 RX ORDER — HYDRALAZINE HYDROCHLORIDE 20 MG/ML
20 INJECTION INTRAMUSCULAR; INTRAVENOUS
Status: COMPLETED | OUTPATIENT
Start: 2023-10-03 | End: 2023-10-03

## 2023-10-03 RX ORDER — CLONIDINE HYDROCHLORIDE 0.1 MG/1
0.1 TABLET ORAL 2 TIMES DAILY
Qty: 60 TABLET | Refills: 0 | Status: SHIPPED | OUTPATIENT
Start: 2023-10-03

## 2023-10-03 RX ORDER — MORPHINE SULFATE 4 MG/ML
4 INJECTION, SOLUTION INTRAMUSCULAR; INTRAVENOUS
Status: DISCONTINUED | OUTPATIENT
Start: 2023-10-03 | End: 2023-10-03

## 2023-10-03 RX ORDER — HYDROCODONE BITARTRATE AND ACETAMINOPHEN 10; 325 MG/1; MG/1
1 TABLET ORAL
Status: COMPLETED | OUTPATIENT
Start: 2023-10-03 | End: 2023-10-03

## 2023-10-03 RX ADMIN — HYDROCODONE BITARTRATE AND ACETAMINOPHEN 1 TABLET: 10; 325 TABLET ORAL at 01:10

## 2023-10-03 RX ADMIN — HYDRALAZINE HYDROCHLORIDE 20 MG: 20 INJECTION, SOLUTION INTRAMUSCULAR; INTRAVENOUS at 12:10

## 2023-10-03 RX ADMIN — SODIUM CHLORIDE 1000 ML: 9 INJECTION, SOLUTION INTRAVENOUS at 12:10

## 2023-10-03 NOTE — ED PROVIDER NOTES
SCRIBE #1 NOTE: I, Kady Zaman, am scribing for, and in the presence of, Solomon Branch MD. I have scribed the entire note.       History     Chief Complaint   Patient presents with    Hematuria     Pt had stent placed for kidney stones x 1 week ago, was told by urologist to come to the ER for blood in urine. Pt reports that she was told she had an appt between 3 and 4 today to have the stent removed.     Review of patient's allergies indicates:  No Known Allergies      History of Present Illness     HPI    10/3/2023, 12:54 AM  History obtained from the patient      History of Present Illness: Tamela Sal is a 48 y.o. female patient with a PMHx of hypertension who presents to the Emergency Department for evaluation of hematuria which onset 1 week PTA. Symptoms are constant and moderate in severity. Patient had stent placed by Dr. Patrick about a week ago for kidney stones and told to have it removed on 10/2 at 3:30. However, pt checked in to the ER instead of registration.  Associated sxs include flank pain and pain at site of stent placement. Patient denies any fever, n/v, and all other sxs at this time. She is compliant on blood pressure medication and her systolic baseline is in the 160s. She was admitted to observation after  ED visit. No further complaints or concerns at this time.       Arrival mode: Personal vehicle      PCP: Cassia, Primary Doctor        Past Medical History:  Past Medical History:   Diagnosis Date    Hypertension        Past Surgical History:  Past Surgical History:   Procedure Laterality Date     SECTION N/A     CYSTOURETEROSCOPY, WITH HOLMIUM LASER LITHOTRIPSY OF URETERAL CALCULUS AND STENT INSERTION Left 2023    Procedure: CYSTOURETEROSCOPY, WITH HOLMIUM LASER LITHOTRIPSY OF URETERAL CALCULUS AND STENT INSERTION;  Surgeon: Jaxson Patrick MD;  Location[facility] OR;  Service: Urology;  Laterality: Left;         Family History:  No family history on  file.    Social History:  Social History     Tobacco Use    Smoking status: Never    Smokeless tobacco: Never   Substance and Sexual Activity    Alcohol use: Not on file    Drug use: Never    Sexual activity: Not on file        Review of Systems     Review of Systems   Constitutional:  Negative for fever.   Gastrointestinal:  Negative for nausea and vomiting.   Genitourinary:  Positive for flank pain and hematuria.        (+) pain at site of stent placement      Physical Exam     Initial Vitals [10/02/23 1645]   BP Pulse Resp Temp SpO2   (!) 189/72 90 18 98.9 °F (37.2 °C) 96 %      MAP       --          Physical Exam  Nursing Notes and Vital Signs Reviewed.  Constitutional: Patient is in no acute distress. Obese  Head: Atraumatic. Normocephalic.  Eyes: PERRL. EOM intact. Conjunctivae are not pale. No scleral icterus.  ENT: Mucous membranes are moist. Oropharynx is clear and symmetric.    Neck: Supple. Full ROM. No lymphadenopathy.  Cardiovascular: Regular rate. Regular rhythm. No murmurs, rubs, or gallops. Distal pulses are 2+ and symmetric.  Pulmonary/Chest: No respiratory distress. Clear to auscultation bilaterally. No wheezing or rales.  Abdominal: Soft and non-distended.  There is no tenderness.  No rebound, guarding, or rigidity. Good bowel sounds.  Genitourinary: No CVA tenderness  Musculoskeletal: Moves all extremities. No obvious deformities. No edema. No calf tenderness.  Skin: Warm and dry.  Neurological:  Alert, awake, and appropriate.  Normal speech.  No acute focal neurological deficits are appreciated.  Psychiatric: Normal affect. Good eye contact. Appropriate in content.     ED Course   Critical Care    Date/Time: 10/3/2023 1:15 AM    Performed by: Solomon Branch MD  Authorized by: Solomon Branch MD  Direct patient critical care time: 13 minutes  Ordering / reviewing critical care time: 5 minutes  Documentation critical care time: 5 minutes  Consulting other physicians critical care time: 7  minutes  Other critical care time: 10 minutes  Total critical care time (exclusive of procedural time) : 40 minutes  Critical care time was exclusive of separately billable procedures and treating other patients and teaching time.  Critical care was necessary to treat or prevent imminent or life-threatening deterioration of the following conditions: hypertension.  Critical care was time spent personally by me on the following activities: blood draw for specimens, discussions with consultants, interpretation of cardiac output measurements, evaluation of patient's response to treatment, examination of patient, obtaining history from patient or surrogate, ordering and performing treatments and interventions, ordering and review of laboratory studies, ordering and review of radiographic studies, pulse oximetry, re-evaluation of patient's condition, review of old charts and development of treatment plan with patient or surrogate.        ED Vital Signs:  Vitals:    10/02/23 1645 10/03/23 0005 10/03/23 0015 10/03/23 0052   BP: (!) 189/72 (!) 218/98 (!) 195/88 (!) 167/104   Pulse: 90 79 79 83   Resp: 18      Temp: 98.9 °F (37.2 °C)      TempSrc: Oral      SpO2: 96% 98% 98% 96%   Weight: (!) 153.2 kg (337 lb 11.9 oz)       10/03/23 0118 10/03/23 0148   BP: 131/61    Pulse: 84    Resp:  18   Temp:     TempSrc:     SpO2: 98%    Weight:         Abnormal Lab Results:  Labs Reviewed   CBC W/ AUTO DIFFERENTIAL - Abnormal; Notable for the following components:       Result Value    RDW 14.6 (*)     All other components within normal limits   COMPREHENSIVE METABOLIC PANEL - Abnormal; Notable for the following components:    Albumin 3.4 (*)     eGFR 46 (*)     All other components within normal limits   URINALYSIS, REFLEX TO URINE CULTURE - Abnormal; Notable for the following components:    Protein, UA 2+ (*)     Glucose, UA 1+ (*)     Occult Blood UA 2+ (*)     Leukocytes, UA 2+ (*)     All other components within normal limits     Narrative:     Specimen Source->Urine   URINALYSIS MICROSCOPIC - Abnormal; Notable for the following components:    RBC, UA 34 (*)     WBC, UA 13 (*)     All other components within normal limits    Narrative:     Specimen Source->Urine   CULTURE, URINE   LIPASE        All Lab Results:  Results for orders placed or performed during the hospital encounter of 10/02/23   CBC W/ AUTO DIFFERENTIAL   Result Value Ref Range    WBC 8.22 3.90 - 12.70 K/uL    RBC 4.07 4.00 - 5.40 M/uL    Hemoglobin 12.2 12.0 - 16.0 g/dL    Hematocrit 37.8 37.0 - 48.5 %    MCV 93 82 - 98 fL    MCH 30.0 27.0 - 31.0 pg    MCHC 32.3 32.0 - 36.0 g/dL    RDW 14.6 (H) 11.5 - 14.5 %    Platelets 217 150 - 450 K/uL    MPV 10.1 9.2 - 12.9 fL    Immature Granulocytes 0.5 0.0 - 0.5 %    Gran # (ANC) 5.1 1.8 - 7.7 K/uL    Immature Grans (Abs) 0.04 0.00 - 0.04 K/uL    Lymph # 2.2 1.0 - 4.8 K/uL    Mono # 0.6 0.3 - 1.0 K/uL    Eos # 0.4 0.0 - 0.5 K/uL    Baso # 0.04 0.00 - 0.20 K/uL    nRBC 0 0 /100 WBC    Gran % 61.5 38.0 - 73.0 %    Lymph % 26.3 18.0 - 48.0 %    Mono % 6.8 4.0 - 15.0 %    Eosinophil % 4.4 0.0 - 8.0 %    Basophil % 0.5 0.0 - 1.9 %    Differential Method Automated    Comp. Metabolic Panel   Result Value Ref Range    Sodium 143 136 - 145 mmol/L    Potassium 3.5 3.5 - 5.1 mmol/L    Chloride 106 95 - 110 mmol/L    CO2 27 23 - 29 mmol/L    Glucose 99 70 - 110 mg/dL    BUN 13 6 - 20 mg/dL    Creatinine 1.4 0.5 - 1.4 mg/dL    Calcium 8.7 8.7 - 10.5 mg/dL    Total Protein 7.3 6.0 - 8.4 g/dL    Albumin 3.4 (L) 3.5 - 5.2 g/dL    Total Bilirubin 0.6 0.1 - 1.0 mg/dL    Alkaline Phosphatase 71 55 - 135 U/L    AST 14 10 - 40 U/L    ALT 15 10 - 44 U/L    eGFR 46 (A) >60 mL/min/1.73 m^2    Anion Gap 10 8 - 16 mmol/L   Lipase   Result Value Ref Range    Lipase 13 4 - 60 U/L   Urinalysis, Reflex to Urine Culture Urine, Clean Catch    Specimen: Urine   Result Value Ref Range    Specimen UA Urine, Clean Catch     Color, UA Yellow Yellow, Straw, Lisbeth     Appearance, UA Clear Clear    pH, UA 7.0 5.0 - 8.0    Specific Gravity, UA 1.015 1.005 - 1.030    Protein, UA 2+ (A) Negative    Glucose, UA 1+ (A) Negative    Ketones, UA Negative Negative    Bilirubin (UA) Negative Negative    Occult Blood UA 2+ (A) Negative    Nitrite, UA Negative Negative    Urobilinogen, UA Negative <2.0 EU/dL    Leukocytes, UA 2+ (A) Negative   Urinalysis Microscopic   Result Value Ref Range    RBC, UA 34 (H) 0 - 4 /hpf    WBC, UA 13 (H) 0 - 5 /hpf    WBC Clumps, UA Rare None-Rare    Bacteria Occasional None-Occ /hpf    Hyaline Casts, UA 0 0-1/lpf /lpf    Microscopic Comment SEE COMMENT         Imaging Results:  Imaging Results    None                 The Emergency Provider reviewed the vital signs and test results, which are outlined above.     ED Discussion     1:09 AM: Discussed pt's case with Dr. Moody (Urology) who recommends patient follow up in clinic in the morning.     1:27 AM: Reassessed pt at this time. Discussed with pt all pertinent ED information and results. Discussed pt dx and plan of tx. Gave pt all f/u and return to the ED instructions. All questions and concerns were addressed at this time. Pt expresses understanding of information and instructions, and is comfortable with plan to discharge. Pt is stable for discharge.    I discussed with patient and/or family/caretaker that evaluation in the ED does not suggest any emergent or life threatening medical conditions requiring immediate intervention beyond what was provided in the ED, and I believe patient is safe for discharge.  Regardless, an unremarkable evaluation in the ED does not preclude the development or presence of a serious of life threatening condition. As such, patient was instructed to return immediately for any worsening or change in current symptoms.         Medical Decision Making  Kidney stone and stent placement was supposed to get her stent removed today    Amount and/or Complexity of Data  Reviewed  Independent Historian: spouse     Details: Daughter  External Data Reviewed: notes.     Details: Patient was supposed to get her stent removed today with Dr. Patrick with Urology but presented to the emergency room  Labs: ordered. Decision-making details documented in ED Course.  Discussion of management or test interpretation with external provider(s): Patient here she was supposed to get a stent removed for a kidney stone by Dr. Patrick but she presented to the wrong department.  In the emergency room she is noted to have high blood pressure was given hydralazine and IV fluids.  Was also in pain was given morphine and Zofran.  Dr. Moody was consulted he was on-call for Dr. Patrick and did not recommend we pull out the stent they will follow up the patient in the next day or 2 to remove the stent.  Patient does have a UTI she is on cefdinir at this time.  She will continue to take it.  Prescriptions for pain medication Norco and Zofran were sent to the pharmacy for her.    Risk  Prescription drug management.  Risk Details: Differential diagnosis includes sepsis, UTI, pyelonephritis, abscess, hypertensive urgency, malignant hypertension                ED Medication(s):  Medications   hydrALAZINE injection 20 mg (20 mg Intravenous Given 10/3/23 0059)   sodium chloride 0.9% bolus 1,000 mL 1,000 mL (0 mLs Intravenous Stopped 10/3/23 0244)   HYDROcodone-acetaminophen  mg per tablet 1 tablet (1 tablet Oral Given 10/3/23 0148)       Discharge Medication List as of 10/3/2023  1:26 AM           Follow-up Information       Jaxson Patrick MD. Schedule an appointment as soon as possible for a visit in 1 day.    Specialty: Urology  Contact information:  [address]  [place] [address]  [phone]                                 Scribe Attestation:   Scribe #1: I performed the above scribed service and the documentation accurately describes the services I performed. I attest to the accuracy of the note.      Attending:   Physician Attestation Statement for Scribe #1: I, Solomon Branch MD, personally performed the services described in this documentation, as scribed by Kady Zaman, in my presence, and it is both accurate and complete.           Clinical Impression       ICD-10-CM ICD-9-CM   1. Urinary tract infection with hematuria, site unspecified  N39.0 599.0    R31.9 599.70   2. Hypertension, unspecified type  I10 401.9       Disposition:   Disposition: Discharged  Condition: Stable        Solomon Branch MD  10/03/23 1926

## 2023-10-04 ENCOUNTER — HOSPITAL ENCOUNTER (EMERGENCY)
Facility: HOSPITAL | Age: 48
Discharge: HOME | End: 2023-10-04
Attending: EMERGENCY MEDICINE
Payer: MEDICAID

## 2023-10-04 VITALS
TEMPERATURE: 99 F | OXYGEN SATURATION: 95 % | BODY MASS INDEX: 50.07 KG/M2 | SYSTOLIC BLOOD PRESSURE: 190 MMHG | RESPIRATION RATE: 18 BRPM | HEART RATE: 90 BPM | WEIGHT: 293 LBS | DIASTOLIC BLOOD PRESSURE: 90 MMHG

## 2023-10-04 DIAGNOSIS — R10.84 GENERALIZED ABDOMINAL PAIN: Primary | ICD-10-CM

## 2023-10-04 DIAGNOSIS — R10.9 ABDOMINAL PAIN: ICD-10-CM

## 2023-10-04 LAB
BACTERIA UR CULT: NO GROWTH
COMPN STONE: NORMAL
LABORATORY COMMENT REPORT: NORMAL
SPECIMEN SOURCE: NORMAL
STONE ANALYSIS IR-IMP: NORMAL

## 2023-10-04 PROCEDURE — 99283 EMERGENCY DEPT VISIT LOW MDM: CPT

## 2023-10-04 RX ORDER — HYDROCODONE BITARTRATE AND ACETAMINOPHEN 5; 325 MG/1; MG/1
1 TABLET ORAL EVERY 6 HOURS PRN
Qty: 12 TABLET | Refills: 0 | Status: SHIPPED | OUTPATIENT
Start: 2023-10-04

## 2023-10-08 NOTE — ED PROVIDER NOTES
Encounter Date: 10/4/2023       History     Chief Complaint   Patient presents with    Post-op Problem     Reports having kidney surgery 6 days ago and reports L pain at the site. Currently taking Cefdinir. Denies fever but also c/o vomiting and dizziness.      Patient complains of left-sided abdominal pain states that she has a urinary stent that needed become replace but is been told by the clinic that they can not see her today to come to the ER.        Review of patient's allergies indicates:  No Known Allergies  Past Medical History:   Diagnosis Date    Hypertension      Past Surgical History:   Procedure Laterality Date     SECTION N/A     CYSTOURETEROSCOPY, WITH HOLMIUM LASER LITHOTRIPSY OF URETERAL CALCULUS AND STENT INSERTION Left 2023    Procedure: CYSTOURETEROSCOPY, WITH HOLMIUM LASER LITHOTRIPSY OF URETERAL CALCULUS AND STENT INSERTION;  Surgeon: Jaxson Patrick MD;  Location: [facility];  Service: Urology;  Laterality: Left;     No family history on file.  Social History     Tobacco Use    Smoking status: Never    Smokeless tobacco: Never   Substance Use Topics    Drug use: Never     Review of Systems   Constitutional:  Negative for fever.   HENT:  Negative for sore throat.    Respiratory:  Negative for shortness of breath.    Cardiovascular:  Negative for chest pain.   Gastrointestinal:  Negative for nausea.   Genitourinary:  Negative for dysuria.   Musculoskeletal:  Negative for back pain.   Skin:  Negative for rash.   Neurological:  Negative for weakness.   Hematological:  Does not bruise/bleed easily.       Physical Exam     Initial Vitals [10/04/23 1516]   BP Pulse Resp Temp SpO2   (!) 190/90 90 18 99.3 °F (37.4 °C) 95 %      MAP       --         Physical Exam    Nursing note and vitals reviewed.  Constitutional: She appears well-developed and well-nourished. She is not diaphoretic. She is active.  Non-toxic appearance. No distress.   HENT:   Head: Normocephalic and atraumatic.   Eyes:  Conjunctivae are normal. Right eye exhibits no discharge. Left eye exhibits no discharge. No scleral icterus.   Neck:   Normal range of motion.  Cardiovascular:  Normal rate, regular rhythm and intact distal pulses.           No murmur heard.  Pulmonary/Chest: Breath sounds normal. No respiratory distress. She has no wheezes.   Abdominal: She exhibits no distension.   Genitourinary:    Genitourinary Comments: RN chaperone, there is no string coming out of the urethra     Musculoskeletal:         General: No tenderness. Normal range of motion.      Cervical back: Normal range of motion.     Neurological: She is alert and oriented to person, place, and time. No cranial nerve deficit. GCS score is 15. GCS eye subscore is 4. GCS verbal subscore is 5. GCS motor subscore is 6.   Skin: Skin is warm and dry. Capillary refill takes less than 2 seconds. No rash noted.   Psychiatric: She has a normal mood and affect. Her behavior is normal. Judgment and thought content normal.         ED Course   Procedures  Labs Reviewed - No data to display       Imaging Results              X-Ray Abdomen AP 1 View ([facility]) (Final result)  Result time 10/04/23 16:20:31      Final [facility] (10/04/23 16:20:31)                   Impression:      Stable left ureteral stent.  No definite stones seen along the course of the stent.      Electronically signed by: Nimo Kearns MD  Date:    10/04/2023  Time:    16:20               Narrative:    EXAMINATION:  XR ABDOMEN AP 1 VIEW    CLINICAL HISTORY:  Unspecified abdominal pain    TECHNIQUE:  Single AP View of the abdomen was performed.    COMPARISON:  09/28/2023, [facility].    FINDINGS:  Lung bases are clear.  Bowel gas pattern is unremarkable.  Stable left ureteral stent.  No definite stones seen along the course of the stent.  Skeletal structures are unremarkable as well.                                       Medications - No data to display  Medical Decision Making  Amount  and/or Complexity of Data Reviewed  Radiology: ordered.    Risk  Prescription drug management.                               Clinical Impression:   Final diagnoses:  [R10.9] Abdominal pain  [R10.9] Abdominal pain - renal stent evaluation  [R10.84] Generalized abdominal pain (Primary)        ED Disposition Condition    Discharge Stable          ED Prescriptions       Medication Sig Dispense Start Date End Date Auth. Provider    HYDROcodone-acetaminophen (NORCO) 5-325 mg per tablet Take 1 tablet by mouth every 6 (six) hours as needed for Pain. 12 tablet 10/4/2023 -- Maykel Frazier NP          Follow-up Information       Follow up With Specialties Details Why Contact Info    Jaxson Patrick MD Urology Go in 2 days  [address] [place] [address] Dr Heather [place]TA [id number]  [phone]               Maykel Frazier NP  10/08/23 [id number]

## 2023-10-10 ENCOUNTER — TELEPHONE (OUTPATIENT)
Dept: UROLOGY | Facility: CLINIC | Age: 48
End: 2023-10-10
Payer: MEDICAID

## 2023-10-10 LAB
FINAL PATHOLOGIC DIAGNOSIS: NORMAL
GROSS: NORMAL
Lab: NORMAL

## 2023-11-01 ENCOUNTER — TELEPHONE (OUTPATIENT)
Dept: UROLOGY | Facility: CLINIC | Age: 48
End: 2023-11-01
Payer: MEDICAID

## 2023-11-01 NOTE — TELEPHONE ENCOUNTER
Attempted to contact pt regarding the stent that she has.  She was a no show to have it removed; not sure if she still has it or not. No answer.  Phone just rang.

## 2024-01-01 PROBLEM — N39.0 UTI (URINARY TRACT INFECTION): Status: RESOLVED | Noted: 2023-09-27 | Resolved: 2024-01-01

## 2025-01-30 RX ORDER — AMLODIPINE BESYLATE 10 MG/1
10 TABLET ORAL DAILY
Qty: 30 TABLET | Refills: 11 | OUTPATIENT
Start: 2025-01-30 | End: 2026-01-30

## (undated) DEVICE — BOWL STERILE LARGE 32OZ

## (undated) DEVICE — TOWEL OR DISP STRL BLUE 4/PK

## (undated) DEVICE — FIBER 272 MICRON HOLMIUM

## (undated) DEVICE — TRAY SKIN SCRUB WET PREMIUM

## (undated) DEVICE — KIT IRRIGATION PUMP SGL ACTION

## (undated) DEVICE — GLOVE SURG BIOGEL LATEX SZ 7.5

## (undated) DEVICE — SYR ONLY LUER LOCK 20CC

## (undated) DEVICE — EXTRACTOR STNE 1.7FRX115CM

## (undated) DEVICE — GOWN POLY REINF X-LONG XL

## (undated) DEVICE — COVER LIGHT HANDLE 80/CA

## (undated) DEVICE — UROVIEW 2600/2800

## (undated) DEVICE — DRAPE T CYSTOSCOPY STERILE

## (undated) DEVICE — SPONGE COTTON TRAY 4X4IN

## (undated) DEVICE — SOL IRR NACL .9% 3000ML

## (undated) DEVICE — SOL IRRI STRL WATER 1000ML

## (undated) DEVICE — GOWN POLY REINF BRTH SLV XL

## (undated) DEVICE — CONTAINER SPECIMEN OR STER 4OZ

## (undated) DEVICE — WIRE GD LUB STD 3CM .038 150CM

## (undated) DEVICE — SET IRR URLGY 2LINE UNIV SPIKE